# Patient Record
Sex: FEMALE | Race: BLACK OR AFRICAN AMERICAN | NOT HISPANIC OR LATINO | Employment: FULL TIME | URBAN - METROPOLITAN AREA
[De-identification: names, ages, dates, MRNs, and addresses within clinical notes are randomized per-mention and may not be internally consistent; named-entity substitution may affect disease eponyms.]

---

## 2019-09-15 ENCOUNTER — OFFICE VISIT (OUTPATIENT)
Dept: URGENT CARE | Facility: CLINIC | Age: 59
End: 2019-09-15
Payer: COMMERCIAL

## 2019-09-15 VITALS
HEART RATE: 100 BPM | WEIGHT: 140 LBS | SYSTOLIC BLOOD PRESSURE: 156 MMHG | DIASTOLIC BLOOD PRESSURE: 87 MMHG | TEMPERATURE: 102 F | HEIGHT: 69 IN | OXYGEN SATURATION: 98 % | BODY MASS INDEX: 20.73 KG/M2

## 2019-09-15 DIAGNOSIS — N30.01 ACUTE CYSTITIS WITH HEMATURIA: ICD-10-CM

## 2019-09-15 DIAGNOSIS — R50.9 FEVER, UNSPECIFIED FEVER CAUSE: Primary | ICD-10-CM

## 2019-09-15 LAB
BILIRUB UR QL STRIP: NEGATIVE
CTP QC/QA: YES
FLUAV AG NPH QL: NEGATIVE
FLUBV AG NPH QL: NEGATIVE
GLUCOSE UR QL STRIP: NEGATIVE
KETONES UR QL STRIP: NEGATIVE
LEUKOCYTE ESTERASE UR QL STRIP: POSITIVE
PH, POC UA: 5 (ref 5–8)
POC BLOOD, URINE: POSITIVE
POC NITRATES, URINE: POSITIVE
PROT UR QL STRIP: POSITIVE
SP GR UR STRIP: 1.02 (ref 1–1.03)
UROBILINOGEN UR STRIP-ACNC: POSITIVE (ref 0.1–1.1)

## 2019-09-15 PROCEDURE — 87804 POCT INFLUENZA A/B: ICD-10-PCS | Mod: QW,S$GLB,, | Performed by: PHYSICIAN ASSISTANT

## 2019-09-15 PROCEDURE — 3008F BODY MASS INDEX DOCD: CPT | Mod: CPTII,S$GLB,, | Performed by: PHYSICIAN ASSISTANT

## 2019-09-15 PROCEDURE — 3008F PR BODY MASS INDEX (BMI) DOCUMENTED: ICD-10-PCS | Mod: CPTII,S$GLB,, | Performed by: PHYSICIAN ASSISTANT

## 2019-09-15 PROCEDURE — 99204 PR OFFICE/OUTPT VISIT, NEW, LEVL IV, 45-59 MIN: ICD-10-PCS | Mod: S$GLB,,, | Performed by: PHYSICIAN ASSISTANT

## 2019-09-15 PROCEDURE — 87804 INFLUENZA ASSAY W/OPTIC: CPT | Mod: QW,S$GLB,, | Performed by: PHYSICIAN ASSISTANT

## 2019-09-15 PROCEDURE — 81003 URINALYSIS AUTO W/O SCOPE: CPT | Mod: QW,S$GLB,, | Performed by: PHYSICIAN ASSISTANT

## 2019-09-15 PROCEDURE — 99204 OFFICE O/P NEW MOD 45 MIN: CPT | Mod: S$GLB,,, | Performed by: PHYSICIAN ASSISTANT

## 2019-09-15 PROCEDURE — 81003 POCT URINALYSIS, DIPSTICK, AUTOMATED, W/O SCOPE: ICD-10-PCS | Mod: QW,S$GLB,, | Performed by: PHYSICIAN ASSISTANT

## 2019-09-15 RX ORDER — CIPROFLOXACIN 500 MG/1
500 TABLET ORAL 2 TIMES DAILY
Qty: 14 TABLET | Refills: 0 | Status: SHIPPED | OUTPATIENT
Start: 2019-09-15 | End: 2019-09-22

## 2019-09-15 RX ORDER — ATENOLOL 50 MG/1
25 TABLET ORAL DAILY
COMMUNITY

## 2019-09-15 NOTE — LETTER
September 15, 2019      [facility]  [place]  [address]  [place] [address]  Phone: [phone]  Fax: [phone]       Patient: Endy Gillis   YOB: 1960  Date of Visit: 09/15/2019    To Whom It May Concern:    Meghana Gillis  was at [facility] on 09/15/2019. She may return to work/school on 09/18/2019 with no restrictions. If you have any questions or concerns, or if I can be of further assistance, please do not hesitate to contact me.    Sincerely,      Christina Watts PA-C

## 2019-09-15 NOTE — PATIENT INSTRUCTIONS
"*Urinary Tract Infections*  1) Avoid tub baths.  2) Always urinate after intercourse(Teens/Adults).  3) Avoid "Fizzy" drinks/soda drinks.  4) Always wipe from front to back.  5) Wear only cotton underwear.  6) Drink a lot of fluids (at least 8-10 glasses of water) for 5 to 7 days to help flush your kidneys. You can also drink 1 shot-sized glass of cranberry juice 3X daily over the next several days to help cleanse your bladder, but studies show that cranberry juice does not cure or prevent a UTI.   7) Take all medications as directed. Make sure to complete all antibiotics as prescribed.    8) For patients above 6 months of age who are not allergic to and are not on anticoagulants, you can alternate Tylenol and Motrin every 4-6 hours for fever above 100.4F and/or pain.  For patients less than 6 months of age, allergic to or intolerant to NSAIDS, have gastritis, gastric ulcers, or history of GI bleeds, are pregnant, or are on anticoagulant therapy, you can take Tylenol every 4 hours as needed for fever above 100.4F and/or pain.   9) You should schedule a follow-up appointment with your Primary Care Provider/Pediatrician for recheck in 2-3 days or as directed at this visit.   10) If your condition fails to improve in a timely manner, you should receive another evaluation by your Primary Care Provider/Pediatrician to discuss your concerns or return to urgent care for a recheck.  If your condition worsens at any time, you should report immediately to your nearest Emergency Department for further evaluation. **You must understand that you have received Urgent Care treatment only and that you may be released before all of your medical problems are known or treated. You, the patient, are responsible to arrange for follow-up care as instructed.           Bladder Infection, Female (Adult)    Urine is normally doesn't have any bacteria in it. But bacteria can get into the urinary tract from the skin around the rectum. Or they " "can travel in the blood from elsewhere in the body. Once they are in your urinary tract, they can cause infection in the urethra (urethritis), the bladder (cystitis), or the kidneys (pyelonephritis).  The most common place for an infection is in the bladder. This is called a bladder infection. This is one of the most common infections in women. Most bladder infections are easily treated. They are not serious unless the infection spreads to the kidney.  The phrases "bladder infection," "UTI," and "cystitis" are often used to describe the same thing. But they are not always the same. Cystitis is an inflammation of the bladder. The most common cause of cystitis is an infection.  Symptoms  The infection causes inflammation in the urethra and bladder. This causes many of the symptoms. The most common symptoms of a bladder infection are:  · Pain or burning when urinating  · Having to urinate more often than usual  · Urgent need to urinate  · Only a small amount of urine comes out  · Blood in urine  · Abdominal discomfort. This is usually in the lower abdomen above the pubic bone.  · Cloudy urine  · Strong- or bad-smelling urine  · Unable to urinate (urinary retention)  · Unable to hold urine in (urinary incontinence)  · Fever  · Loss of appetite  · Confusion (in older adults)  Causes  Bladder infections are not contagious. You can't get one from someone else, from a toilet seat, or from sharing a bath.  The most common cause of bladder infections is bacteria from the bowels. The bacteria get onto the skin around the opening of the urethra. From there, they can get into the urine and travel up to the bladder, causing inflammation and infection. This usually happens because of:  · Wiping improperly after urinating. Always wipe from front to back.  · Bowel incontinence  · Pregnancy  · Procedures such as having a catheter inserted  · Older age  · Not emptying your bladder. This can allow bacteria a chance to grow in your " urine.  · Dehydration  · Constipation  · Sex  · Use of a diaphragm for birth control   Treatment  Bladder infections are diagnosed by a urine test. They are treated with antibiotics and usually clear up quickly without complications. Treatment helps prevent a more serious kidney infection.  Medicines  Medicines can help in the treatment of a bladder infection:  · Take antibiotics until they are used up, even if you feel better. It is important to finish them to make sure the infection has cleared.  · You can use acetaminophen or ibuprofen for pain, fever, or discomfort, unless another medicine was prescribed. If you have chronic liver or kidney disease, talk with your healthcare provider before using these medicines. Also talk with your provider if you've ever had a stomach ulcer or gastrointestinal bleeding, or are taking blood-thinner medicines.  · If you are given phenazopydridine to reduce burning with urination, it will cause your urine to become a bright orange color. This can stain clothing.  Care and prevention  These self-care steps can help prevent future infections:  · Drink plenty of fluids to prevent dehydration and flush out your bladder. Do this unless you must restrict fluids for other health reasons, or your doctor told you not to.  · Proper cleaning after going to the bathroom is important. Wipe from front to back after using the toilet to prevent the spread of bacteria.  · Urinate more often. Don't try to hold urine in for a long time.  · Wear loose-fitting clothes and cotton underwear. Avoid tight-fitting pants.  · Improve your diet and prevent constipation. Eat more fresh fruit and vegetables, and fiber, and less junk and fatty foods.  · Avoid sex until your symptoms are gone.  · Avoid caffeine, alcohol, and spicy foods. These can irritate your bladder.  · Urinate right after intercourse to flush out your bladder.  · If you use birth control pills and have frequent bladder infections, discuss it  with your doctor.  Follow-up care  Call your healthcare provider if all symptoms are not gone after 3 days of treatment. This is especially important if you have repeat infections.  If a culture was done, you will be told if your treatment needs to be changed. If directed, you can call to find out the results.  If X-rays were done, you will be told if the results will affect your treatment.  Call 911  Call 911 if any of the following occur:  · Trouble breathing  · Hard to wake up or confusion  · Fainting or loss of consciousness  · Rapid heart rate  When to seek medical advice  Call your healthcare provider right away if any of these occur:  · Fever of 100.4ºF (38.0ºC) or higher, or as directed by your healthcare provider  · Symptoms are not better by the third day of treatment  · Back or belly (abdominal) pain that gets worse  · Repeated vomiting, or unable to keep medicine down  · Weakness or dizziness  · Vaginal discharge  · Pain, redness, or swelling in the outer vaginal area (labia)  Date Last Reviewed: 10/1/2016  © 9348-8036 [employer]. [address] [place] [address], [place] [address]. All rights reserved. This information is not intended as a substitute for professional medical care. Always follow your healthcare professional's instructions.

## 2019-09-15 NOTE — PROGRESS NOTES
"Subjective:       Patient ID: Endy Gillis is a 59 y.o. female.    Vitals:  height is 5' 9" (1.753 m) and weight is 63.5 kg (140 lb). Her tympanic temperature is 101.8 °F (38.8 °C) (abnormal). Her blood pressure is 156/87 (abnormal) and her pulse is 100. Her oxygen saturation is 98%.     Chief Complaint: Nausea    59-year-old female presents to clinic today with complaints of feeling bad for the past week.  Patient states that she has been running a fever of around 101, having night sweats, fatigue, and decreased appetite.  She does report intermittent nausea.  Patient states that she has also had an increase in urinary frequency and urgency outside of her norm.  Other than that, patient states that she has had no real symptoms.  She states that she is just feeling terrible.  Patient denies any other complaints at this time.    Fever    This is a new problem. The current episode started in the past 7 days. The problem occurs constantly. The problem has been unchanged. The maximum temperature noted was 101 to 101.9 F. The temperature was taken using an axillary reading. Associated symptoms include nausea. Pertinent negatives include no abdominal pain, chest pain, congestion, coughing, diarrhea, ear pain, headaches, muscle aches, rash, sleepiness, sore throat, urinary pain, vomiting or wheezing. She has tried acetaminophen and NSAIDs for the symptoms. The treatment provided mild relief.   Risk factors: no contaminated food, no contaminated water, no hx of cancer, no immunosuppression, no occupational exposure, no recent sickness, no recent travel and no sick contacts        Constitution: Positive for appetite change (decreased), chills, fatigue and fever.   HENT: Negative for ear pain, congestion and sore throat.    Neck: Negative for painful lymph nodes.   Cardiovascular: Negative for chest pain and leg swelling.   Eyes: Negative for double vision and blurred vision.   Respiratory: Negative for cough, shortness of " breath and wheezing.    Gastrointestinal: Positive for nausea. Negative for abdominal pain, vomiting, diarrhea, dark colored stools, rectal bleeding and bowel incontinence.   Genitourinary: Positive for frequency and urgency. Negative for dysuria, urine decreased, flank pain, bladder incontinence, bed wetting, hematuria, history of kidney stones and vaginal discharge.   Musculoskeletal: Negative for muscle cramps.   Skin: Negative for color change, pale, rash and bruising.   Allergic/Immunologic: Negative for seasonal allergies.   Neurological: Negative for dizziness, light-headedness, passing out and headaches.   Hematologic/Lymphatic: Negative for swollen lymph nodes.   Psychiatric/Behavioral: Negative for nervous/anxious, sleep disturbance and depression. The patient is not nervous/anxious.        Objective:      Physical Exam   Constitutional: She is oriented to person, place, and time. She appears well-developed and well-nourished. She is cooperative.  Non-toxic appearance. She does not appear ill. No distress.   HENT:   Head: Normocephalic and atraumatic.   Right Ear: Hearing, tympanic membrane, external ear and ear canal normal.   Left Ear: Hearing, tympanic membrane, external ear and ear canal normal.   Nose: Nose normal. No mucosal edema, rhinorrhea or nasal deformity. No epistaxis. Right sinus exhibits no maxillary sinus tenderness and no frontal sinus tenderness. Left sinus exhibits no maxillary sinus tenderness and no frontal sinus tenderness.   Mouth/Throat: Uvula is midline, oropharynx is clear and moist and mucous membranes are normal. No trismus in the jaw. Normal dentition. No uvula swelling. No posterior oropharyngeal erythema.   Eyes: Conjunctivae and lids are normal. Right eye exhibits no discharge. Left eye exhibits no discharge. No scleral icterus.   Sclera clear bilat   Neck: Trachea normal, normal range of motion, full passive range of motion without pain and phonation normal. Neck supple.    Cardiovascular: Normal rate, regular rhythm, normal heart sounds, intact distal pulses and normal pulses.   Pulmonary/Chest: Effort normal and breath sounds normal. No respiratory distress.   Abdominal: Soft. Normal appearance and bowel sounds are normal. She exhibits no distension, no pulsatile midline mass and no mass. There is no hepatosplenomegaly. There is no tenderness. There is no rigidity, no rebound, no guarding, no CVA tenderness, no tenderness at McBurney's point and negative Osman's sign.   Musculoskeletal: Normal range of motion. She exhibits no edema or deformity.   Neurological: She is alert and oriented to person, place, and time. She exhibits normal muscle tone. Coordination normal.   Skin: Skin is warm, dry and intact. She is not diaphoretic. No pallor.   Psychiatric: She has a normal mood and affect. Her speech is normal and behavior is normal. Judgment and thought content normal. Cognition and memory are normal.   Nursing note and vitals reviewed.      Results for orders placed or performed in visit on 09/15/19   POCT Influenza A/B   Result Value Ref Range    Rapid Influenza A Ag Negative Negative    Rapid Influenza B Ag Negative Negative     Acceptable Yes    POCT Urinalysis, Dipstick, Automated, W/O Scope   Result Value Ref Range    POC Blood, Urine Positive (A) Negative    POC Bilirubin, Urine Negative Negative    POC Urobilinogen, Urine positive 0.1 - 1.1    POC Ketones, Urine Negative Negative    POC Protein, Urine Positive (A) Negative    POC Nitrates, Urine Positive (A) Negative    POC Glucose, Urine Negative Negative    pH, UA 5.0 5 - 8    POC Specific Gravity, Urine 1.020 1.003 - 1.029    POC Leukocytes, Urine Positive (A) Negative       Assessment:       1. Fever, unspecified fever cause    2. Acute cystitis with hematuria        Plan:         Fever, unspecified fever cause  -     POCT Influenza A/B  -     POCT Urinalysis, Dipstick, Automated, W/O Scope    Acute  "cystitis with hematuria  -     Culture, Urine  -     ciprofloxacin HCl (CIPRO) 500 MG tablet; Take 1 tablet (500 mg total) by mouth 2 (two) times daily. for 7 days  Dispense: 14 tablet; Refill: 0      Patient Instructions   *Urinary Tract Infections*  1) Avoid tub baths.  2) Always urinate after intercourse(Teens/Adults).  3) Avoid "Fizzy" drinks/soda drinks.  4) Always wipe from front to back.  5) Wear only cotton underwear.  6) Drink a lot of fluids (at least 8-10 glasses of water) for 5 to 7 days to help flush your kidneys. You can also drink 1 shot-sized glass of cranberry juice 3X daily over the next several days to help cleanse your bladder, but studies show that cranberry juice does not cure or prevent a UTI.   7) Take all medications as directed. Make sure to complete all antibiotics as prescribed.    8) For patients above 6 months of age who are not allergic to and are not on anticoagulants, you can alternate Tylenol and Motrin every 4-6 hours for fever above 100.4F and/or pain.  For patients less than 6 months of age, allergic to or intolerant to NSAIDS, have gastritis, gastric ulcers, or history of GI bleeds, are pregnant, or are on anticoagulant therapy, you can take Tylenol every 4 hours as needed for fever above 100.4F and/or pain.   9) You should schedule a follow-up appointment with your Primary Care Provider/Pediatrician for recheck in 2-3 days or as directed at this visit.   10) If your condition fails to improve in a timely manner, you should receive another evaluation by your Primary Care Provider/Pediatrician to discuss your concerns or return to urgent care for a recheck.  If your condition worsens at any time, you should report immediately to your nearest Emergency Department for further evaluation. **You must understand that you have received Urgent Care treatment only and that you may be released before all of your medical problems are known or treated. You, the patient, are responsible to " "arrange for follow-up care as instructed.           Bladder Infection, Female (Adult)    Urine is normally doesn't have any bacteria in it. But bacteria can get into the urinary tract from the skin around the rectum. Or they can travel in the blood from elsewhere in the body. Once they are in your urinary tract, they can cause infection in the urethra (urethritis), the bladder (cystitis), or the kidneys (pyelonephritis).  The most common place for an infection is in the bladder. This is called a bladder infection. This is one of the most common infections in women. Most bladder infections are easily treated. They are not serious unless the infection spreads to the kidney.  The phrases "bladder infection," "UTI," and "cystitis" are often used to describe the same thing. But they are not always the same. Cystitis is an inflammation of the bladder. The most common cause of cystitis is an infection.  Symptoms  The infection causes inflammation in the urethra and bladder. This causes many of the symptoms. The most common symptoms of a bladder infection are:  · Pain or burning when urinating  · Having to urinate more often than usual  · Urgent need to urinate  · Only a small amount of urine comes out  · Blood in urine  · Abdominal discomfort. This is usually in the lower abdomen above the pubic bone.  · Cloudy urine  · Strong- or bad-smelling urine  · Unable to urinate (urinary retention)  · Unable to hold urine in (urinary incontinence)  · Fever  · Loss of appetite  · Confusion (in older adults)  Causes  Bladder infections are not contagious. You can't get one from someone else, from a toilet seat, or from sharing a bath.  The most common cause of bladder infections is bacteria from the bowels. The bacteria get onto the skin around the opening of the urethra. From there, they can get into the urine and travel up to the bladder, causing inflammation and infection. This usually happens because of:  · Wiping improperly " after urinating. Always wipe from front to back.  · Bowel incontinence  · Pregnancy  · Procedures such as having a catheter inserted  · Older age  · Not emptying your bladder. This can allow bacteria a chance to grow in your urine.  · Dehydration  · Constipation  · Sex  · Use of a diaphragm for birth control   Treatment  Bladder infections are diagnosed by a urine test. They are treated with antibiotics and usually clear up quickly without complications. Treatment helps prevent a more serious kidney infection.  Medicines  Medicines can help in the treatment of a bladder infection:  · Take antibiotics until they are used up, even if you feel better. It is important to finish them to make sure the infection has cleared.  · You can use acetaminophen or ibuprofen for pain, fever, or discomfort, unless another medicine was prescribed. If you have chronic liver or kidney disease, talk with your healthcare provider before using these medicines. Also talk with your provider if you've ever had a stomach ulcer or gastrointestinal bleeding, or are taking blood-thinner medicines.  · If you are given phenazopydridine to reduce burning with urination, it will cause your urine to become a bright orange color. This can stain clothing.  Care and prevention  These self-care steps can help prevent future infections:  · Drink plenty of fluids to prevent dehydration and flush out your bladder. Do this unless you must restrict fluids for other health reasons, or your doctor told you not to.  · Proper cleaning after going to the bathroom is important. Wipe from front to back after using the toilet to prevent the spread of bacteria.  · Urinate more often. Don't try to hold urine in for a long time.  · Wear loose-fitting clothes and cotton underwear. Avoid tight-fitting pants.  · Improve your diet and prevent constipation. Eat more fresh fruit and vegetables, and fiber, and less junk and fatty foods.  · Avoid sex until your symptoms are  gone.  · Avoid caffeine, alcohol, and spicy foods. These can irritate your bladder.  · Urinate right after intercourse to flush out your bladder.  · If you use birth control pills and have frequent bladder infections, discuss it with your doctor.  Follow-up care  Call your healthcare provider if all symptoms are not gone after 3 days of treatment. This is especially important if you have repeat infections.  If a culture was done, you will be told if your treatment needs to be changed. If directed, you can call to find out the results.  If X-rays were done, you will be told if the results will affect your treatment.  Call 911  Call 911 if any of the following occur:  · Trouble breathing  · Hard to wake up or confusion  · Fainting or loss of consciousness  · Rapid heart rate  When to seek medical advice  Call your healthcare provider right away if any of these occur:  · Fever of 100.4ºF (38.0ºC) or higher, or as directed by your healthcare provider  · Symptoms are not better by the third day of treatment  · Back or belly (abdominal) pain that gets worse  · Repeated vomiting, or unable to keep medicine down  · Weakness or dizziness  · Vaginal discharge  · Pain, redness, or swelling in the outer vaginal area (labia)  Date Last Reviewed: 10/1/2016  © 2283-2566 [employer] [address] [place] [address], [place] [address]. All rights reserved. This information is not intended as a substitute for professional medical care. Always follow your healthcare professional's instructions.

## 2019-09-19 ENCOUNTER — TELEPHONE (OUTPATIENT)
Dept: URGENT CARE | Facility: CLINIC | Age: 59
End: 2019-09-19

## 2019-09-20 LAB
BACTERIA UR CULT: ABNORMAL
BACTERIA UR CULT: ABNORMAL
OTHER ANTIBIOTIC SUSC ISLT: ABNORMAL

## 2019-09-22 ENCOUNTER — TELEPHONE (OUTPATIENT)
Dept: URGENT CARE | Facility: CLINIC | Age: 59
End: 2019-09-22

## 2019-09-22 NOTE — TELEPHONE ENCOUNTER
Left messages at home and mobile number asking pt to return call. See urine culture results from 9/15/19 visit.

## 2019-09-23 ENCOUNTER — TELEPHONE (OUTPATIENT)
Dept: URGENT CARE | Facility: CLINIC | Age: 59
End: 2019-09-23

## 2019-09-24 ENCOUNTER — TELEPHONE (OUTPATIENT)
Dept: URGENT CARE | Facility: CLINIC | Age: 59
End: 2019-09-24

## 2019-09-24 RX ORDER — SULFAMETHOXAZOLE AND TRIMETHOPRIM 800; 160 MG/1; MG/1
1 TABLET ORAL 2 TIMES DAILY
Qty: 20 TABLET | Refills: 0 | Status: SHIPPED | OUTPATIENT
Start: 2019-09-24 | End: 2019-10-04

## 2019-09-24 NOTE — TELEPHONE ENCOUNTER
Called to discuss positive urine culture results with patient.  Bacteria is resistant to Cipro which is what patient was prescribed at her last visit.  Sent in Bactrim DS twice daily for the next 10 days to [facility] in [place].  Left urgent message for patient to return my call.

## 2019-09-24 NOTE — TELEPHONE ENCOUNTER
Called emergency contact, reports he talked to her this am and she seemed to be doing fine. Advised for him to ask her to give us a call back.

## 2019-09-28 ENCOUNTER — TELEPHONE (OUTPATIENT)
Dept: URGENT CARE | Facility: CLINIC | Age: 59
End: 2019-09-28

## 2019-09-28 NOTE — TELEPHONE ENCOUNTER
Attempted to call patient on house and mobile numbers listed in chart.  There was no answer at either number.  Left message for patient to return my call.

## 2019-11-04 ENCOUNTER — OFFICE VISIT (OUTPATIENT)
Dept: WOUND CARE | Facility: HOSPITAL | Age: 59
End: 2019-11-04
Attending: SURGERY
Payer: COMMERCIAL

## 2019-11-04 VITALS — HEART RATE: 77 BPM | DIASTOLIC BLOOD PRESSURE: 92 MMHG | SYSTOLIC BLOOD PRESSURE: 158 MMHG

## 2019-11-04 DIAGNOSIS — M86.171 OTHER ACUTE OSTEOMYELITIS OF RIGHT FOOT: Primary | ICD-10-CM

## 2019-11-04 DIAGNOSIS — L97.512 NEUROPATHIC ULCER OF FOOT WITH FAT LAYER EXPOSED, RIGHT: ICD-10-CM

## 2019-11-04 DIAGNOSIS — S34.109A: ICD-10-CM

## 2019-11-04 PROBLEM — M86.9 OSTEOMYELITIS: Status: ACTIVE | Noted: 2019-11-04

## 2019-11-04 PROCEDURE — 99499 NO LOS: ICD-10-PCS | Mod: ,,, | Performed by: SURGERY

## 2019-11-04 PROCEDURE — 27201912 HC WOUND CARE DEBRIDEMENT SUPPLIES

## 2019-11-04 PROCEDURE — 99203 OFFICE O/P NEW LOW 30 MIN: CPT

## 2019-11-04 PROCEDURE — 11042 DBRDMT SUBQ TIS 1ST 20SQCM/<: CPT

## 2019-11-04 PROCEDURE — 99499 UNLISTED E&M SERVICE: CPT | Mod: ,,, | Performed by: SURGERY

## 2019-11-04 RX ORDER — DULOXETIN HYDROCHLORIDE 60 MG/1
60 CAPSULE, DELAYED RELEASE ORAL DAILY
COMMUNITY

## 2019-11-04 RX ORDER — PANTOPRAZOLE SODIUM 40 MG/1
40 TABLET, DELAYED RELEASE ORAL DAILY
COMMUNITY

## 2019-11-04 RX ORDER — HYDROCODONE BITARTRATE AND ACETAMINOPHEN 5; 325 MG/1; MG/1
1 TABLET ORAL EVERY 6 HOURS PRN
COMMUNITY

## 2019-11-04 RX ORDER — CEFADROXIL 500 MG/1
1 CAPSULE ORAL EVERY 12 HOURS
COMMUNITY
Start: 2019-10-06 | End: 2019-11-11

## 2019-11-04 RX ORDER — SUCRALFATE 1 G/1
1 TABLET ORAL 4 TIMES DAILY
COMMUNITY

## 2019-11-04 RX ORDER — TEMAZEPAM 7.5 MG/1
30 CAPSULE ORAL NIGHTLY PRN
COMMUNITY

## 2019-11-04 RX ORDER — MEGESTROL ACETATE 125 MG/ML
625 SUSPENSION ORAL DAILY
COMMUNITY

## 2019-11-04 NOTE — ASSESSMENT & PLAN NOTE
Patient will need better offloading as she is noncompliant with crutches due to difficulty.  Will attempt order the patient a knee scooter.  Patient will keep follow-up appointment with Dr. Jack to see if further antibiotic treatment is indicated.  Will change to silver rope dressing change.  Sharp debridement as needed.

## 2019-11-04 NOTE — PROGRESS NOTES
[facility]  Wound Care  History and Physical    Problem List Items Addressed This Visit     Osteomyelitis - Primary    Overview     Patient sustained trauma to her right heel and developed an open wound several months ago.  Patient developed worsening wound infection and was admitted to the hospital and started on IV antibiotics.  She underwent debridement and states she was treated for osteomyelitis.  She was discharged to LTAC facility where she received 2 weeks of IV antibiotics.  Patient remains on oral antibiotics and has follow-up scheduled Infectious Disease tomorrow.  Patient has been receiving Dakin's dressing changes.  Patient has underlying neuropathy from previous spinal cord injury. Patient also has a foot deformity.  Patient's has been attempting offloading with crutches but has difficulty ambulating with crutches so she has been noncompliant.  Patient denies any fever chills or significant drainage.         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  Patient developed worsening wound infection and was admitted to the hospital and started on IV antibiotics.  She underwent debridement and states she was treated for osteomyelitis.  She was discharged to LTAC facility where she received 2 weeks of IV antibiotics.  Patient remains on oral antibiotics and has follow-up scheduled Infectious Disease tomorrow.  Patient has been receiving Dakin's dressing changes.  Patient has underlying neuropathy from previous spinal cord injury. Patient also has a foot deformity.  Patient's has been attempting offloading with crutches but has difficulty ambulating with crutches so she has been noncompliant.  Patient denies any fever chills or significant drainage.           Current Assessment & Plan     Patient will need better offloading as she is noncompliant with crutches due to  difficulty.  Will attempt order the patient a knee scooter.  Patient will keep follow-up appointment with Dr. Jack to see if further antibiotic treatment is indicated.  Will change to silver rope dressing change.  Sharp debridement as needed.                 History:    Past Medical History:   Diagnosis Date    Hypertension        No past surgical history on file.    Family History   Problem Relation Age of Onset    No Known Problems Mother     No Known Problems Father         reports that she has never smoked. She has never used smokeless tobacco. She reports that she does not drink alcohol or use drugs.    has a current medication list which includes the following prescription(s): atenolol.    Allergies:  Patient has no known allergies.    Review of Systems:  ROS      There were no vitals filed for this visit.      BMI:  There is no height or weight on file to calculate BMI.    Physical Exam:  Physical Exam  Awake alert no acute distress.  Chest is clear with equal breath sounds bilaterally.  Heart is regular.  Abdomen is soft with no masses or tenderness.  Patient has hammertoe deformity of the right ft digits. There is a created during type ulcer of the right heel.  There is significant surrounding callus and maceration.  There is serous type drainage but no sign of infection.  There is no palpable bone on examination of the wound.  There is granulation tissue present.  Patient's foot is warm with a palpable pedal pulse.  A1C:  No results for input(s): HGBA1C in the last 2160 hours.  BMP:  No results for input(s): GLU, NA, K, CL, CO2, BUN, CREATININE, CALCIUM, MG in the last 2160 hours.   CBC:  No results for input(s): WBC, RBC, HGB, HCT, PLT, MCV, MCH, MCHC in the last 2160 hours.  CMP:  No results for input(s): GLU, CALCIUM, ALBUMIN, PROT, NA, K, CO2, CL, BUN, ALKPHOS, ALT, AST, BILITOT in the last 2160 hours.    Invalid input(s): CREATININ  PREALBUMIN:  No results for input(s): PREALBUMIN in the last  2160 hours.  WOUND CULTURES:  No results for input(s): LABAERO in the last 2160 hours.        Plan:  See Wound Docs note for plan and follow up.        Delfin MONTALVO Marino [facility]

## 2019-11-11 ENCOUNTER — OFFICE VISIT (OUTPATIENT)
Dept: WOUND CARE | Facility: HOSPITAL | Age: 59
End: 2019-11-11
Attending: SURGERY
Payer: COMMERCIAL

## 2019-11-11 VITALS
TEMPERATURE: 98 F | HEART RATE: 69 BPM | SYSTOLIC BLOOD PRESSURE: 168 MMHG | DIASTOLIC BLOOD PRESSURE: 105 MMHG | RESPIRATION RATE: 18 BRPM

## 2019-11-11 DIAGNOSIS — M86.171 ACUTE OSTEOMYELITIS OF RIGHT FOOT: ICD-10-CM

## 2019-11-11 DIAGNOSIS — M21.371 FOOT DROP, RIGHT: ICD-10-CM

## 2019-11-11 DIAGNOSIS — L97.512 NEUROPATHIC ULCER OF FOOT WITH FAT LAYER EXPOSED, RIGHT: ICD-10-CM

## 2019-11-11 PROBLEM — M86.271 SUBACUTE OSTEOMYELITIS OF RIGHT FOOT: Status: ACTIVE | Noted: 2019-11-04

## 2019-11-11 PROCEDURE — 27201912 HC WOUND CARE DEBRIDEMENT SUPPLIES

## 2019-11-11 PROCEDURE — 99499 UNLISTED E&M SERVICE: CPT | Mod: ,,, | Performed by: SURGERY

## 2019-11-11 PROCEDURE — 11042 DBRDMT SUBQ TIS 1ST 20SQCM/<: CPT

## 2019-11-11 PROCEDURE — 99499 NO LOS: ICD-10-PCS | Mod: ,,, | Performed by: SURGERY

## 2019-11-11 NOTE — ASSESSMENT & PLAN NOTE
See Dr. Naresh Jack as planned to determine need for further antibiotic treatment.  Obtain records from hospitalization.

## 2019-11-11 NOTE — PROGRESS NOTES
[facility]  Wound Care  Progress Note    Problem List Items Addressed This Visit        Neuro    Foot drop, right    Overview     Patient has chronic right footdrop and has been use in an AFO a prosthetic for a long time.            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  Patient developed worsening wound infection and was admitted to the hospital and started on IV antibiotics.  She underwent debridement and states she was treated for osteomyelitis.  She was discharged to LT facility where she received 2 weeks of IV antibiotics.  Patient remains on oral antibiotics and has follow-up scheduled with Infectious Disease.  Patient had been receiving Dakin's dressing changes.  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 Patient also has a foot deformity.  Patient's has been attempting offloading with crutches but has difficulty ambulating with crutches.  Patient denies any fever chills or significant drainage.           Current Assessment & Plan     Debridement to remove nonviable tissue and maintain active phase wound healing.  Improve offloading with heel wedge offloading shoe.  Patient will attempt to obtain a knee walker.  Continue silver alginate.            ID    Acute osteomyelitis of right foot    Overview     Patient sustained trauma to her right heel and developed an open wound several months ago.  Patient developed worsening wound infection and was admitted to the hospital and started on IV antibiotics.  She underwent debridement and states she was treated for osteomyelitis.  She was discharged to LTAC facility where she received 2 weeks of IV antibiotics.  Patient remains on oral antibiotics and has follow-up scheduled with Infectious Disease.  Patient had been receiving Dakin's dressing changes.  Patient has  underlying neuropathy from previous spinal cord injury with right foot drop. Patient also has a foot deformity.  Patient's has been attempting offloading with crutches but has difficulty ambulating with crutches.  Patient denies any fever chills or significant drainage.         Current Assessment & Plan     See Dr. Naresh Jack as planned to determine need for further antibiotic treatment.  Obtain records from hospitalization.               See wound doc progress notes. Documents will be scanned.        Jl ANDERSON Hebert [facility]

## 2019-11-11 NOTE — ASSESSMENT & PLAN NOTE
Debridement to remove nonviable tissue and maintain active phase wound healing.  Improve offloading with heel wedge offloading shoe.  Patient will attempt to obtain a knee walker.  Continue silver alginate.

## 2019-11-18 ENCOUNTER — OFFICE VISIT (OUTPATIENT)
Dept: WOUND CARE | Facility: HOSPITAL | Age: 59
End: 2019-11-18
Attending: SURGERY
Payer: COMMERCIAL

## 2019-11-18 VITALS — RESPIRATION RATE: 16 BRPM | TEMPERATURE: 98 F

## 2019-11-18 DIAGNOSIS — M86.171 ACUTE OSTEOMYELITIS OF RIGHT FOOT: ICD-10-CM

## 2019-11-18 PROCEDURE — 99499 NO LOS: ICD-10-PCS | Mod: ,,, | Performed by: SURGERY

## 2019-11-18 PROCEDURE — 11042 DBRDMT SUBQ TIS 1ST 20SQCM/<: CPT

## 2019-11-18 PROCEDURE — 99499 UNLISTED E&M SERVICE: CPT | Mod: ,,, | Performed by: SURGERY

## 2019-11-18 PROCEDURE — 27201912 HC WOUND CARE DEBRIDEMENT SUPPLIES

## 2019-11-18 RX ORDER — AMOXICILLIN AND CLAVULANATE POTASSIUM 875; 125 MG/1; MG/1
1 TABLET, FILM COATED ORAL 2 TIMES DAILY
Refills: 0 | COMMUNITY
Start: 2019-11-12

## 2019-11-18 RX ORDER — SULFAMETHOXAZOLE AND TRIMETHOPRIM 800; 160 MG/1; MG/1
1 TABLET ORAL 2 TIMES DAILY
Refills: 0 | COMMUNITY
Start: 2019-11-12

## 2019-11-18 NOTE — ASSESSMENT & PLAN NOTE
Wound decreased in size.  There is no drainage or signs of infection.  Wound is fairly clean with granulation tissue.  There is no exposed bone.  Will continue silver rope.

## 2019-11-18 NOTE — PROGRESS NOTES
[facility]  Wound Care  Progress Note    Problem List Items Addressed This Visit     Acute osteomyelitis of right foot    Overview     Patient sustained trauma to her right heel and developed an open wound several months ago.  Patient developed worsening wound infection and was admitted to the hospital and started on IV antibiotics.  She underwent debridement and states she was treated for osteomyelitis.  She was discharged to LTAC facility where she received 2 weeks of IV antibiotics.  Patient remains on oral antibiotics and has follow-up scheduled with Infectious Disease.  Patient had been receiving Dakin's dressing changes.  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 Patient also has a foot deformity.  Patient's has been attempting offloading with crutches but has difficulty ambulating with crutches.  Patient denies any fever chills or significant drainage.         Current Assessment & Plan     Wound decreased in size.  There is no drainage or signs of infection.  Wound is fairly clean with granulation tissue.  There is no exposed bone.  Will continue silver rope.               See wound doc progress notes. Documents will be scanned.        Delfin Silva  [facility]

## 2019-11-25 ENCOUNTER — TELEPHONE (OUTPATIENT)
Dept: URGENT CARE | Facility: CLINIC | Age: 59
End: 2019-11-25

## 2019-11-25 ENCOUNTER — OFFICE VISIT (OUTPATIENT)
Dept: WOUND CARE | Facility: HOSPITAL | Age: 59
End: 2019-11-25
Attending: SURGERY
Payer: COMMERCIAL

## 2019-11-25 VITALS
TEMPERATURE: 98 F | DIASTOLIC BLOOD PRESSURE: 98 MMHG | SYSTOLIC BLOOD PRESSURE: 130 MMHG | RESPIRATION RATE: 20 BRPM | HEART RATE: 78 BPM

## 2019-11-25 DIAGNOSIS — L97.512 NEUROPATHIC ULCER OF FOOT WITH FAT LAYER EXPOSED, RIGHT: ICD-10-CM

## 2019-11-25 DIAGNOSIS — M86.171 ACUTE OSTEOMYELITIS OF RIGHT FOOT: ICD-10-CM

## 2019-11-25 PROCEDURE — 99499 UNLISTED E&M SERVICE: CPT | Mod: ,,, | Performed by: SURGERY

## 2019-11-25 PROCEDURE — 99499 NO LOS: ICD-10-PCS | Mod: ,,, | Performed by: SURGERY

## 2019-11-25 PROCEDURE — 27201912 HC WOUND CARE DEBRIDEMENT SUPPLIES

## 2019-11-25 PROCEDURE — 11042 DBRDMT SUBQ TIS 1ST 20SQCM/<: CPT

## 2019-11-25 NOTE — ASSESSMENT & PLAN NOTE
Wound is much improved.  Continue debridement to maintain active phase wound healing.  Close monitoring for signs of infection.  Continue offloading with a knee walker.  Long-term a new AFO will be created.

## 2019-11-25 NOTE — TELEPHONE ENCOUNTER
Received certified letter back to office. This was specific to resistant UTI in sept. In reviewing chart today, pt has been on numerous a/b for osteomyelitis including IV treatment. No further tx or contact needed.

## 2019-11-25 NOTE — PROGRESS NOTES
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  Patient developed worsening wound infection and was admitted to the hospital and started on IV antibiotics.  She underwent debridement and states she was treated for osteomyelitis.  She was discharged to LTAC facility where she received 2 weeks of IV antibiotics.  Patient remains on oral antibiotics and has follow-up scheduled with Infectious Disease.  Patient had been receiving Dakin's dressing changes.  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 Patient also has a foot deformity.  Patient's had been attempting offloading with crutches but has difficulty ambulating with crutches.  She has been able to obtain a knee walker for use.  Patient denies any fever chills or significant drainage.           Current Assessment & Plan     Wound is much improved.  Continue debridement to maintain active phase wound healing.  Close monitoring for signs of infection.  Continue offloading with a knee walker.  Long-term a new AFO will be created.            ID    Acute osteomyelitis of right foot    Overview     Patient sustained trauma to her right heel and developed an open wound several months ago.  Patient developed worsening wound infection and was admitted to the hospital and started on IV antibiotics.  She underwent debridement and states she was treated for osteomyelitis.  She was discharged to LTAC facility where she received 2 weeks of IV antibiotics.  Patient remains on oral antibiotics and has follow-up scheduled with Infectious Disease.  Patient had been receiving Dakin's dressing changes.  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 Patient also has a  foot deformity.  Patient's has been attempting offloading with crutches but has difficulty ambulating with crutches.  Patient denies any fever chills or significant drainage.  Patient was able to follow up with Dr. Jack and has been restarted on antibiotics, Augmentin and Bactrim.               See wound doc progress notes. Documents will be scanned.        Jl ANDERSON Fishman  [facility]

## 2019-12-02 ENCOUNTER — OFFICE VISIT (OUTPATIENT)
Dept: WOUND CARE | Facility: HOSPITAL | Age: 59
End: 2019-12-02
Attending: SURGERY
Payer: COMMERCIAL

## 2019-12-02 VITALS
HEART RATE: 81 BPM | TEMPERATURE: 98 F | RESPIRATION RATE: 18 BRPM | DIASTOLIC BLOOD PRESSURE: 87 MMHG | SYSTOLIC BLOOD PRESSURE: 121 MMHG

## 2019-12-02 DIAGNOSIS — L97.512 NEUROPATHIC ULCER OF FOOT WITH FAT LAYER EXPOSED, RIGHT: ICD-10-CM

## 2019-12-02 PROCEDURE — 99499 NO LOS: ICD-10-PCS | Mod: ,,, | Performed by: SURGERY

## 2019-12-02 PROCEDURE — 27201912 HC WOUND CARE DEBRIDEMENT SUPPLIES

## 2019-12-02 PROCEDURE — 99499 UNLISTED E&M SERVICE: CPT | Mod: ,,, | Performed by: SURGERY

## 2019-12-02 PROCEDURE — 11042 DBRDMT SUBQ TIS 1ST 20SQCM/<: CPT

## 2019-12-02 NOTE — ASSESSMENT & PLAN NOTE
Wound improved.  No sign of infection.   Continue offloading.  Continue sharp debridement is needed.  Continue turned dressing changes.

## 2019-12-02 NOTE — PROGRESS NOTES
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  Patient developed worsening wound infection and was admitted to the hospital and started on IV antibiotics.  She underwent debridement and states she was treated for osteomyelitis.  She was discharged to LTAC facility where she received 2 weeks of IV antibiotics.  Patient remains on oral antibiotics and has follow-up scheduled with Infectious Disease.  Patient had been receiving Dakin's dressing changes.  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 Patient also has a foot deformity.  Patient's had been attempting offloading with crutches but has difficulty ambulating with crutches.  She has been able to obtain a knee walker for use.  Patient denies any fever chills or significant drainage.           Current Assessment & Plan     Wound improved.  No sign of infection.   Continue offloading.  Continue sharp debridement is needed.  Continue turned dressing changes.               See wound doc progress notes. Documents will be scanned.        Delfin Silva  [facility]

## 2019-12-09 ENCOUNTER — OFFICE VISIT (OUTPATIENT)
Dept: WOUND CARE | Facility: HOSPITAL | Age: 59
End: 2019-12-09
Attending: SURGERY
Payer: COMMERCIAL

## 2019-12-09 VITALS — TEMPERATURE: 98 F | RESPIRATION RATE: 18 BRPM

## 2019-12-09 DIAGNOSIS — M21.371 FOOT DROP, RIGHT: ICD-10-CM

## 2019-12-09 DIAGNOSIS — L97.512 NEUROPATHIC ULCER OF FOOT WITH FAT LAYER EXPOSED, RIGHT: Primary | ICD-10-CM

## 2019-12-09 DIAGNOSIS — M86.171 ACUTE OSTEOMYELITIS OF RIGHT FOOT: ICD-10-CM

## 2019-12-09 PROCEDURE — 99499 NO LOS: ICD-10-PCS | Mod: ,,, | Performed by: SURGERY

## 2019-12-09 PROCEDURE — 99499 UNLISTED E&M SERVICE: CPT | Mod: ,,, | Performed by: SURGERY

## 2019-12-09 PROCEDURE — 27201912 HC WOUND CARE DEBRIDEMENT SUPPLIES

## 2019-12-09 PROCEDURE — 11042 DBRDMT SUBQ TIS 1ST 20SQCM/<: CPT

## 2019-12-09 NOTE — ASSESSMENT & PLAN NOTE
Continue debridement to maintain active phase wound healing.  Continue offloading with a knee walker, crutches and he will offloading shoe.

## 2019-12-09 NOTE — PROGRESS NOTES
[facility]  Wound Care  Progress Note    Problem List Items Addressed This Visit        Neuro    Foot drop, right    Overview     Patient has chronic right footdrop and has been use in an AFO a prosthetic for a long time.            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  Patient developed worsening wound infection and was admitted to the hospital and started on IV antibiotics.  She underwent debridement and states she was treated for osteomyelitis.  She was discharged to LTAC facility where she received 2 weeks of IV antibiotics.  Patient remains on oral antibiotics and has follow-up scheduled with Infectious Disease.  Patient had been receiving Dakin's dressing changes.  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 Patient also has a foot deformity.  Patient's had been attempting offloading with crutches but has difficulty ambulating with crutches.  She has been able to obtain a knee walker for use.  Patient denies any fever chills or significant drainage.           Current Assessment & Plan     Continue debridement to maintain active phase wound healing.  Continue offloading with a knee walker, crutches and he will offloading shoe.            ID    Acute osteomyelitis of right foot    Overview     Patient sustained trauma to her right heel and developed an open wound several months ago.  Patient developed worsening wound infection and was admitted to the hospital and started on IV antibiotics.  She underwent debridement and states she was treated for osteomyelitis.  She was discharged to LTAC facility where she received 2 weeks of IV antibiotics.  Patient remains on oral antibiotics and has follow-up scheduled with Infectious Disease.  Patient had been receiving Dakin's dressing changes.  Patient has underlying  neuropathy from previous spinal cord injury with right foot drop. Patient also has a foot deformity.  Patient's has been attempting offloading with crutches but has difficulty ambulating with crutches.  Patient denies any fever chills or significant drainage.  Patient was able to follow up with Dr. Jack and has been restarted on antibiotics, Augmentin and Bactrim.         Current Assessment & Plan     Patient remains on antibiotics.               See wound doc progress notes. Documents will be scanned.        Jl ANDERSON Fishman  [facility]

## 2019-12-16 ENCOUNTER — OFFICE VISIT (OUTPATIENT)
Dept: WOUND CARE | Facility: HOSPITAL | Age: 59
End: 2019-12-16
Attending: SURGERY
Payer: COMMERCIAL

## 2019-12-16 DIAGNOSIS — L97.512 NEUROPATHIC ULCER OF FOOT WITH FAT LAYER EXPOSED, RIGHT: ICD-10-CM

## 2019-12-16 PROCEDURE — 99499 NO LOS: ICD-10-PCS | Mod: ,,, | Performed by: SURGERY

## 2019-12-16 PROCEDURE — 27201912 HC WOUND CARE DEBRIDEMENT SUPPLIES

## 2019-12-16 PROCEDURE — 99499 UNLISTED E&M SERVICE: CPT | Mod: ,,, | Performed by: SURGERY

## 2019-12-16 PROCEDURE — 11042 DBRDMT SUBQ TIS 1ST 20SQCM/<: CPT

## 2019-12-16 NOTE — ASSESSMENT & PLAN NOTE
Wound slowly improving.  Bone palpable at the base of the wound.  No significant drainage.  Wound granulating.  Wound decreased in size.  Continue sharp debridement is needed.  Continue offloading

## 2019-12-16 NOTE — PROGRESS NOTES
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  Patient developed worsening wound infection and was admitted to the hospital and started on IV antibiotics.  She underwent debridement and states she was treated for osteomyelitis.  She was discharged to LTAC facility where she received 2 weeks of IV antibiotics.  Patient remains on oral antibiotics and has follow-up scheduled with Infectious Disease.  Patient had been receiving Dakin's dressing changes.  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 Patient also has a foot deformity.  Patient's had been attempting offloading with crutches but has difficulty ambulating with crutches.  She has been able to obtain a knee walker for use.  Patient denies any fever chills or significant drainage.           Current Assessment & Plan     Wound slowly improving.  Bone palpable at the base of the wound.  No significant drainage.  Wound granulating.  Wound decreased in size.  Continue sharp debridement is needed.  Continue offloading               See wound doc progress notes. Documents will be scanned.        Delfin Silva  [facility]

## 2019-12-17 VITALS — SYSTOLIC BLOOD PRESSURE: 122 MMHG | RESPIRATION RATE: 18 BRPM | HEART RATE: 82 BPM | DIASTOLIC BLOOD PRESSURE: 80 MMHG

## 2019-12-23 ENCOUNTER — OFFICE VISIT (OUTPATIENT)
Dept: WOUND CARE | Facility: HOSPITAL | Age: 59
End: 2019-12-23
Attending: SURGERY
Payer: COMMERCIAL

## 2019-12-23 VITALS — RESPIRATION RATE: 20 BRPM | SYSTOLIC BLOOD PRESSURE: 116 MMHG | DIASTOLIC BLOOD PRESSURE: 86 MMHG | HEART RATE: 85 BPM

## 2019-12-23 DIAGNOSIS — L97.512 NEUROPATHIC ULCER OF FOOT WITH FAT LAYER EXPOSED, RIGHT: Primary | ICD-10-CM

## 2019-12-23 DIAGNOSIS — M21.371 FOOT DROP, RIGHT: ICD-10-CM

## 2019-12-23 PROCEDURE — 99499 NO LOS: ICD-10-PCS | Mod: ,,, | Performed by: SURGERY

## 2019-12-23 PROCEDURE — 27201912 HC WOUND CARE DEBRIDEMENT SUPPLIES

## 2019-12-23 PROCEDURE — 11042 DBRDMT SUBQ TIS 1ST 20SQCM/<: CPT

## 2019-12-23 PROCEDURE — 99499 UNLISTED E&M SERVICE: CPT | Mod: ,,, | Performed by: SURGERY

## 2019-12-23 NOTE — ASSESSMENT & PLAN NOTE
Wound improving.  There is no tunneling posteriorly towards the Achilles tendon.  Continue offloading.  Continue debridement to maintain active phase wound healing.  Patient remains on antibiotic therapy for osteomyelitis from Infectious Disease.

## 2019-12-23 NOTE — PROGRESS NOTES
[facility]  Wound Care  Progress Note    Problem List Items Addressed This Visit        Neuro    Foot drop, right    Overview     Patient has chronic right footdrop and has been use in an AFO a prosthetic for a long time.            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  Patient developed worsening wound infection and was admitted to the hospital and started on IV antibiotics.  She underwent debridement and states she was treated for osteomyelitis.  She was discharged to [facility] facility where she received 2 weeks of IV antibiotics.  Patient remains on oral antibiotics and has follow-up scheduled with Infectious Disease.  Patient had been receiving Dakin's dressing changes.  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 Patient also has a foot deformity.  Patient's had been attempting offloading with crutches but has difficulty ambulating with crutches.  She has been able to obtain a knee walker for use.  Patient denies any fever chills or significant drainage.           Current Assessment & Plan     Wound improving.  There is no tunneling posteriorly towards the Achilles tendon.  Continue offloading.  Continue debridement to maintain active phase wound healing.  Patient remains on antibiotic therapy for osteomyelitis from Infectious Disease.               See wound doc progress notes. Documents will be scanned.        Jl Fishman  [facility]

## 2019-12-30 ENCOUNTER — OFFICE VISIT (OUTPATIENT)
Dept: WOUND CARE | Facility: HOSPITAL | Age: 59
End: 2019-12-30
Attending: SURGERY
Payer: COMMERCIAL

## 2019-12-30 VITALS — SYSTOLIC BLOOD PRESSURE: 136 MMHG | HEART RATE: 84 BPM | DIASTOLIC BLOOD PRESSURE: 90 MMHG | RESPIRATION RATE: 20 BRPM

## 2019-12-30 DIAGNOSIS — L97.512 NEUROPATHIC ULCER OF FOOT WITH FAT LAYER EXPOSED, RIGHT: ICD-10-CM

## 2019-12-30 PROCEDURE — 99499 UNLISTED E&M SERVICE: CPT | Mod: ,,, | Performed by: SURGERY

## 2019-12-30 PROCEDURE — 99499 NO LOS: ICD-10-PCS | Mod: ,,, | Performed by: SURGERY

## 2019-12-30 PROCEDURE — 11042 DBRDMT SUBQ TIS 1ST 20SQCM/<: CPT

## 2019-12-30 PROCEDURE — 27201912 HC WOUND CARE DEBRIDEMENT SUPPLIES

## 2019-12-30 NOTE — PROGRESS NOTES
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  Patient developed worsening wound infection and was admitted to the hospital and started on IV antibiotics.  She underwent debridement and states she was treated for osteomyelitis.  She was discharged to LTAC facility where she received 2 weeks of IV antibiotics.  Patient remains on oral antibiotics and has follow-up scheduled with Infectious Disease.  Patient had been receiving Dakin's dressing changes.  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 Patient also has a foot deformity.  Patient's had been attempting offloading with crutches but has difficulty ambulating with crutches.  She has been able to obtain a knee walker for use.  Patient denies any fever chills or significant drainage.           Current Assessment & Plan     Patient remains on oral antibiotics.  She has follow-up with Infectious Disease next week.  There is no significant drainage.  There are areas of granulation.  There is palpable bone but is not appear to be exposed.  There is a sinus tract at 12:00 p.m..  There is no sign of infection.  Will continue to pack with silver rope in offload.  Complete course of antibiotics               See wound doc progress notes. Documents will be scanned.        Delfin Silva  [facility]

## 2019-12-30 NOTE — ASSESSMENT & PLAN NOTE
Patient remains on oral antibiotics.  She has follow-up with Infectious Disease next week.  There is no significant drainage.  There are areas of granulation.  There is palpable bone but is not appear to be exposed.  There is a sinus tract at 12:00 p.m..  There is no sign of infection.  Will continue to pack with silver rope in offload.  Complete course of antibiotics

## 2020-01-06 ENCOUNTER — OFFICE VISIT (OUTPATIENT)
Dept: WOUND CARE | Facility: HOSPITAL | Age: 60
End: 2020-01-06
Attending: SURGERY
Payer: COMMERCIAL

## 2020-01-06 VITALS
DIASTOLIC BLOOD PRESSURE: 104 MMHG | HEART RATE: 66 BPM | SYSTOLIC BLOOD PRESSURE: 143 MMHG | TEMPERATURE: 98 F | RESPIRATION RATE: 16 BRPM

## 2020-01-06 DIAGNOSIS — L97.512 NEUROPATHIC ULCER OF FOOT WITH FAT LAYER EXPOSED, RIGHT: Primary | ICD-10-CM

## 2020-01-06 DIAGNOSIS — M21.371 FOOT DROP, RIGHT: ICD-10-CM

## 2020-01-06 DIAGNOSIS — M86.171 ACUTE OSTEOMYELITIS OF RIGHT FOOT: ICD-10-CM

## 2020-01-06 PROCEDURE — 11042 DBRDMT SUBQ TIS 1ST 20SQCM/<: CPT

## 2020-01-06 PROCEDURE — 99499 NO LOS: ICD-10-PCS | Mod: ,,, | Performed by: SURGERY

## 2020-01-06 PROCEDURE — 27201912 HC WOUND CARE DEBRIDEMENT SUPPLIES

## 2020-01-06 PROCEDURE — 99499 UNLISTED E&M SERVICE: CPT | Mod: ,,, | Performed by: SURGERY

## 2020-01-06 NOTE — PROGRESS NOTES
[facility]  Wound Care  Progress Note    Problem List Items Addressed This Visit        Neuro    Foot drop, right    Overview     Patient has chronic right footdrop and has been use in an AFO a prosthetic for a long time.            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  Patient developed worsening wound infection and was admitted to the hospital and started on IV antibiotics.  She underwent debridement and states she was treated for osteomyelitis.  She was discharged to LTAC facility where she received 2 weeks of IV antibiotics.  Patient remains on oral antibiotics and has follow-up scheduled with Infectious Disease.  Patient had been receiving Dakin's dressing changes.  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 Patient also has a foot deformity.  Patient's had been attempting offloading with crutches but has difficulty ambulating with crutches.  She has been able to obtain a knee walker for use.  Patient denies any fever chills or significant drainage.           Current Assessment & Plan     Wound is improving with decreased size of underlying cavity.  Continue debridement to maintain active phase wound healing.  Patient may return to work with use of knee walker for offloading.            ID    Acute osteomyelitis of right foot    Overview     Patient sustained trauma to her right heel and developed an open wound several months ago.  Patient developed worsening wound infection and was admitted to the hospital and started on IV antibiotics.  She underwent debridement and states she was treated for osteomyelitis.  She was discharged to LTAC facility where she received 2 weeks of IV antibiotics.  Patient remains on oral antibiotics and has follow-up scheduled with Infectious Disease.  Patient had been  receiving Dakin's dressing changes.  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 Patient also has a foot deformity.  Patient's has been attempting offloading with crutches but has difficulty ambulating with crutches.  Patient denies any fever chills or significant drainage.  Patient was able to follow up with Dr. Jack and has been restarted on antibiotics, Augmentin and Bactrim.         Current Assessment & Plan     Patient has follow-up with Dr. Jack today.  There is no sign of bone exposure.  Wound overall continues to improve               See wound doc progress notes. Documents will be scanned.        Jl ANDERSNO Fishman  [facility]

## 2020-01-06 NOTE — ASSESSMENT & PLAN NOTE
Wound is improving with decreased size of underlying cavity.  Continue debridement to maintain active phase wound healing.  Patient may return to work with use of knee walker for offloading.

## 2020-01-06 NOTE — ASSESSMENT & PLAN NOTE
Patient has follow-up with Dr. Jack today.  There is no sign of bone exposure.  Wound overall continues to improve

## 2020-01-13 ENCOUNTER — OFFICE VISIT (OUTPATIENT)
Dept: WOUND CARE | Facility: HOSPITAL | Age: 60
End: 2020-01-13
Attending: SURGERY
Payer: COMMERCIAL

## 2020-01-13 VITALS — HEART RATE: 73 BPM | DIASTOLIC BLOOD PRESSURE: 91 MMHG | TEMPERATURE: 98 F | SYSTOLIC BLOOD PRESSURE: 152 MMHG

## 2020-01-13 DIAGNOSIS — L97.512 NEUROPATHIC ULCER OF FOOT WITH FAT LAYER EXPOSED, RIGHT: ICD-10-CM

## 2020-01-13 PROCEDURE — 27201912 HC WOUND CARE DEBRIDEMENT SUPPLIES

## 2020-01-13 PROCEDURE — 99499 UNLISTED E&M SERVICE: CPT | Mod: ,,, | Performed by: SURGERY

## 2020-01-13 PROCEDURE — 99499 NO LOS: ICD-10-PCS | Mod: ,,, | Performed by: SURGERY

## 2020-01-13 PROCEDURE — 11042 DBRDMT SUBQ TIS 1ST 20SQCM/<: CPT

## 2020-01-13 NOTE — ASSESSMENT & PLAN NOTE
Tract has decreased in depth.  Wound remains clean and granulating.  There is no sign of infection.  Continue silver dressing and offloading.

## 2020-01-13 NOTE — PROGRESS NOTES
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  Patient developed worsening wound infection and was admitted to the hospital and started on IV antibiotics.  She underwent debridement and states she was treated for osteomyelitis.  She was discharged to LTAC facility where she received 2 weeks of IV antibiotics.  Patient remains on oral antibiotics and has follow-up scheduled with Infectious Disease.  Patient had been receiving Dakin's dressing changes.  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 Patient also has a foot deformity.  Patient's had been attempting offloading with crutches but has difficulty ambulating with crutches.  She has been able to obtain a knee walker for use.  Patient denies any fever chills or significant drainage.           Current Assessment & Plan     Tract has decreased in depth.  Wound remains clean and granulating.  There is no sign of infection.  Continue silver dressing and offloading.               See wound doc progress notes. Documents will be scanned.        Delfin Silva  [facility]

## 2020-01-20 ENCOUNTER — OFFICE VISIT (OUTPATIENT)
Dept: WOUND CARE | Facility: HOSPITAL | Age: 60
End: 2020-01-20
Attending: SURGERY
Payer: COMMERCIAL

## 2020-01-20 VITALS
SYSTOLIC BLOOD PRESSURE: 139 MMHG | DIASTOLIC BLOOD PRESSURE: 90 MMHG | RESPIRATION RATE: 18 BRPM | TEMPERATURE: 98 F | HEART RATE: 73 BPM

## 2020-01-20 DIAGNOSIS — M86.171 ACUTE OSTEOMYELITIS OF RIGHT FOOT: ICD-10-CM

## 2020-01-20 DIAGNOSIS — L97.512 NEUROPATHIC ULCER OF FOOT WITH FAT LAYER EXPOSED, RIGHT: Primary | ICD-10-CM

## 2020-01-20 DIAGNOSIS — M21.371 FOOT DROP, RIGHT: ICD-10-CM

## 2020-01-20 PROCEDURE — 11042 DBRDMT SUBQ TIS 1ST 20SQCM/<: CPT

## 2020-01-20 PROCEDURE — 99499 NO LOS: ICD-10-PCS | Mod: ,,, | Performed by: SURGERY

## 2020-01-20 PROCEDURE — 99499 UNLISTED E&M SERVICE: CPT | Mod: ,,, | Performed by: SURGERY

## 2020-01-20 PROCEDURE — 27201912 HC WOUND CARE DEBRIDEMENT SUPPLIES

## 2020-01-20 NOTE — ASSESSMENT & PLAN NOTE
Wound continues to improve.  Continue debridement to maintain active phase wound healing.  Continue offloading with knee walker.  Patient is anticipating returning to work.

## 2020-01-20 NOTE — PROGRESS NOTES
[facility]  Wound Care  Progress Note    Problem List Items Addressed This Visit        Neuro    Foot drop, right    Overview     Patient has chronic right footdrop and has been use in an AFO a prosthetic for a long time.            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  Patient developed worsening wound infection and was admitted to the hospital and started on IV antibiotics.  She underwent debridement and states she was treated for osteomyelitis.  She was discharged to LTAC facility where she received 2 weeks of IV antibiotics.  Patient remains on oral antibiotics and has follow-up scheduled with Infectious Disease.  Patient had been receiving Dakin's dressing changes.  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 Patient also has a foot deformity.  Patient's had been attempting offloading with crutches but has difficulty ambulating with crutches.  She has been able to obtain a knee walker for use.  Patient denies any fever chills or significant drainage.           Current Assessment & Plan     Wound continues to improve.  Continue debridement to maintain active phase wound healing.  Continue offloading with knee walker.  Patient is anticipating returning to work.            ID    Acute osteomyelitis of right foot    Overview     Patient sustained trauma to her right heel and developed an open wound several months ago.  Patient developed worsening wound infection and was admitted to the hospital and started on IV antibiotics.  She underwent debridement and states she was treated for osteomyelitis.  She was discharged to LTAC facility where she received 2 weeks of IV antibiotics.  Patient remains on oral antibiotics and has follow-up scheduled with Infectious Disease.  Patient had been receiving Dakin's dressing  changes.  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 Patient also has a foot deformity.  Patient's has been attempting offloading with crutches but has difficulty ambulating with crutches.  Patient denies any fever chills or significant drainage.  Patient was able to follow up with Dr. Jack and has been restarted on antibiotics, Augmentin and Bactrim.         Current Assessment & Plan     Patient remains on antibiotic therapy.               See wound doc progress notes. Documents will be scanned.        Jl Fishman  [facility]

## 2020-01-31 ENCOUNTER — OFFICE VISIT (OUTPATIENT)
Dept: WOUND CARE | Facility: HOSPITAL | Age: 60
End: 2020-01-31
Attending: SURGERY
Payer: COMMERCIAL

## 2020-01-31 VITALS
SYSTOLIC BLOOD PRESSURE: 128 MMHG | RESPIRATION RATE: 19 BRPM | HEART RATE: 92 BPM | DIASTOLIC BLOOD PRESSURE: 100 MMHG | TEMPERATURE: 98 F

## 2020-01-31 DIAGNOSIS — L97.412: ICD-10-CM

## 2020-01-31 PROCEDURE — 11042 DBRDMT SUBQ TIS 1ST 20SQCM/<: CPT

## 2020-01-31 PROCEDURE — 87186 SC STD MICRODIL/AGAR DIL: CPT

## 2020-01-31 PROCEDURE — 27201912 HC WOUND CARE DEBRIDEMENT SUPPLIES

## 2020-01-31 PROCEDURE — 87070 CULTURE OTHR SPECIMN AEROBIC: CPT

## 2020-01-31 PROCEDURE — 87077 CULTURE AEROBIC IDENTIFY: CPT

## 2020-01-31 NOTE — PROGRESS NOTES
[facility]  Wound Care  Progress Note    Problem List Items Addressed This Visit        Derm    Neuropathic ulcer of heel, right, with fat layer exposed    Overview       HPI:  Patient sustained trauma to her right heel and developed an open wound several months ago.  Patient developed worsening wound infection and was admitted to the hospital and started on IV antibiotics.  She underwent debridement and states she was treated for osteomyelitis.  She was discharged to LTAC facility where she received 2 weeks of IV antibiotics.  Patient remains on oral antibiotics and has follow-up scheduled with Infectious Disease.  Patient had been receiving Dakin's dressing changes.  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 Patient also has a foot deformity.  Patient's had been attempting offloading with crutches but has difficulty ambulating with crutches.  She has been able to obtain a knee walker for use.  Patient denies any fever chills or significant drainage.      Location: right plantar heel    Duration: 10-1-19    Context: neuropathic    Associated Signs & Symptoms: pt c/o pain    Timing: random    Severity: 9/10 at times    Quality: sharp and burning    Modifying Factors: nothing                   Physical Exam   Constitutional: She is oriented to person, place, and time. She appears well-developed and well-nourished.   HENT:   Head: Normocephalic.   Pulmonary/Chest: Effort normal.   Musculoskeletal:   Right foot drop   Neurological: She is alert and oriented to person, place, and time.   Skin: Skin is warm and dry.   Neuropathic ulcer to right plantar foot with adipose exposed   Psychiatric: She has a normal mood and affect. Her behavior is normal. Judgment and thought content normal.     Ulcer debrided, still with tunneling, will continue to pack with silver alginate, culture obtained due to non-healing nature of the wound, will recheck in one week,  offload pressure at all times with knee scooter, keep clean and dry, will obtain records from ID, pt is still on antibiotics and has labs coming up.  See wound doc progress notes. Documents will be scanned.        Donna DickensBoeuf  [facility]

## 2020-02-03 LAB — BACTERIA SPEC AEROBE CULT: ABNORMAL

## 2020-02-07 ENCOUNTER — OFFICE VISIT (OUTPATIENT)
Dept: WOUND CARE | Facility: HOSPITAL | Age: 60
End: 2020-02-07
Attending: SURGERY
Payer: COMMERCIAL

## 2020-02-07 DIAGNOSIS — M86.171 ACUTE OSTEOMYELITIS OF RIGHT FOOT: ICD-10-CM

## 2020-02-07 DIAGNOSIS — L97.412: Primary | ICD-10-CM

## 2020-02-07 PROCEDURE — 27201912 HC WOUND CARE DEBRIDEMENT SUPPLIES

## 2020-02-07 PROCEDURE — 11042 DBRDMT SUBQ TIS 1ST 20SQCM/<: CPT
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
## 2020-02-10 VITALS
SYSTOLIC BLOOD PRESSURE: 125 MMHG | DIASTOLIC BLOOD PRESSURE: 83 MMHG | TEMPERATURE: 98 F | HEART RATE: 62 BPM | RESPIRATION RATE: 20 BRPM

## 2020-02-14 ENCOUNTER — OFFICE VISIT (OUTPATIENT)
Dept: WOUND CARE | Facility: HOSPITAL | Age: 60
End: 2020-02-14
Attending: SURGERY
Payer: COMMERCIAL

## 2020-02-14 VITALS
SYSTOLIC BLOOD PRESSURE: 129 MMHG | DIASTOLIC BLOOD PRESSURE: 86 MMHG | TEMPERATURE: 98 F | HEART RATE: 85 BPM | RESPIRATION RATE: 18 BRPM

## 2020-02-14 DIAGNOSIS — M86.171 ACUTE OSTEOMYELITIS OF RIGHT FOOT: ICD-10-CM

## 2020-02-14 DIAGNOSIS — L97.412: Primary | ICD-10-CM

## 2020-02-14 PROCEDURE — 11042 DBRDMT SUBQ TIS 1ST 20SQCM/<: CPT

## 2020-02-14 PROCEDURE — 27201912 HC WOUND CARE DEBRIDEMENT SUPPLIES

## 2020-02-14 NOTE — PROGRESS NOTES
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  Patient developed worsening wound infection and was admitted to the hospital and started on IV antibiotics.  She underwent debridement and states she was treated for osteomyelitis.  She was discharged to LTAC facility where she received 2 weeks of IV antibiotics.  Patient remains on oral antibiotics and has follow-up scheduled with Infectious Disease.  Patient had been receiving Dakin's dressing changes.  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 Patient also has a foot deformity.  Patient's had been attempting offloading with crutches but has difficulty ambulating with crutches.  She has been able to obtain a knee walker for use.  Patient denies any fever chills or significant drainage.      Location: right plantar heel    Duration: 10-1-19    Context: neuropathic, pt states her feeling comes and goes.    Associated Signs & Symptoms: pt c/o pain    Timing: random    Severity: 9/10 at times    Quality: sharp and burning    Modifying Factors: nothing    2-7-20: here for wound care for neuropathic ulcer to right plantar heel  2-14-20: here for wound care for neuropathic ulcer to right plantar heel              ID    Acute osteomyelitis of right foot    Overview     Patient sustained trauma to her right heel and developed an open wound several months ago.  Patient developed worsening wound infection and was admitted to the hospital and started on IV antibiotics.  She underwent debridement and states she was treated for osteomyelitis.  She was discharged to LTAC facility where she received 2 weeks of IV antibiotics.  Patient remains on oral antibiotics and has follow-up scheduled  with Infectious Disease.  Patient had been receiving Dakin's dressing changes.  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 Patient also has a foot deformity.  Patient's has been attempting offloading with crutches but has difficulty ambulating with crutches.  Patient denies any fever chills or significant drainage.  Patient was able to follow up with Dr. Jack and has been restarted on antibiotics, Augmentin and Bactrim.             Physical Exam   Constitutional: She is oriented to person, place, and time. She appears well-developed and well-nourished.   HENT:   Head: Normocephalic.   Pulmonary/Chest: Effort normal.   Musculoskeletal: Normal range of motion.   Neurological: She is alert and oriented to person, place, and time.   Skin: Skin is warm and dry.   Neuropathic ulcer to right plantar heel with adipose exposed.   Psychiatric: She has a normal mood and affect. Her behavior is normal. Judgment normal.   Vitals reviewed.    Ulcer debrided, bigger today, pt has f/u with ID coming up, if wound does not improve may need to refer to surgeon, will continue topical abx daily and will recheck in one week.  See wound doc progress notes. Documents will be scanned.        Laina LeBoeuf [facility]

## 2020-02-21 ENCOUNTER — OFFICE VISIT (OUTPATIENT)
Dept: WOUND CARE | Facility: HOSPITAL | Age: 60
End: 2020-02-21
Attending: SURGERY
Payer: COMMERCIAL

## 2020-02-21 VITALS
TEMPERATURE: 98 F | SYSTOLIC BLOOD PRESSURE: 135 MMHG | DIASTOLIC BLOOD PRESSURE: 84 MMHG | RESPIRATION RATE: 18 BRPM | HEART RATE: 68 BPM

## 2020-02-21 DIAGNOSIS — L97.412: Primary | ICD-10-CM

## 2020-02-21 DIAGNOSIS — M86.171 ACUTE OSTEOMYELITIS OF RIGHT FOOT: ICD-10-CM

## 2020-02-21 PROCEDURE — 11042 DBRDMT SUBQ TIS 1ST 20SQCM/<: CPT

## 2020-02-21 PROCEDURE — 27201912 HC WOUND CARE DEBRIDEMENT SUPPLIES

## 2020-02-21 NOTE — PROGRESS NOTES
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  Patient developed worsening wound infection and was admitted to the hospital and started on IV antibiotics.  She underwent debridement and states she was treated for osteomyelitis.  She was discharged to LTAC facility where she received 2 weeks of IV antibiotics.  Patient remains on oral antibiotics and has follow-up scheduled with Infectious Disease.  Patient had been receiving Dakin's dressing changes.  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 Patient also has a foot deformity.  Patient's had been attempting offloading with crutches but has difficulty ambulating with crutches.  She has been able to obtain a knee walker for use.  Patient denies any fever chills or significant drainage.      Location: right plantar heel    Duration: 10-1-19    Context: neuropathic, pt states her feeling comes and goes.    Associated Signs & Symptoms: pt c/o pain    Timing: random    Severity: 9/10 at times    Quality: sharp and burning    Modifying Factors: nothing    2-7-20: here for wound care for neuropathic ulcer to right plantar heel  2-14-20: here for wound care for neuropathic ulcer to right plantar heel  2-21-20: here for wound care for neuropathic ulcer to right plantar heel            ID    Acute osteomyelitis of right foot    Overview     Patient sustained trauma to her right heel and developed an open wound several months ago.  Patient developed worsening wound infection and was admitted to the hospital and started on IV antibiotics.  She underwent debridement and states she was treated for osteomyelitis.  She was discharged to LTAC facility where she received 2 weeks of IV  antibiotics.  Patient remains on oral antibiotics and has follow-up scheduled with Infectious Disease.  Patient had been receiving Dakin's dressing changes.  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 Patient also has a foot deformity.  Patient's has been attempting offloading with crutches but has difficulty ambulating with crutches.  Patient denies any fever chills or significant drainage.  Patient was able to follow up with Dr. Jack and has been restarted on antibiotics, Augmentin and Bactrim.             Physical Exam   Constitutional: She is oriented to person, place, and time. She appears well-developed and well-nourished.   HENT:   Head: Normocephalic.   Pulmonary/Chest: Effort normal.   Musculoskeletal: Normal range of motion.   Pt offloads pressure with knee scooter   Neurological: She is alert and oriented to person, place, and time.   Skin: Skin is warm and dry.   Neuropathic ulcer to right plantar heel with adipose exposed. Pt has foot drop to that foot due to an old spinal injury.   Psychiatric: She has a normal mood and affect. Her behavior is normal. Judgment and thought content normal.   Vitals reviewed.    Ulcer debrided, will continue topical abx and calcium alginate daily, offload pressure at all times with knee scooter, will recheck in one week.  See wound doc progress notes. Documents will be scanned.        Donna DickensBoeuf  [facility]

## 2020-02-28 ENCOUNTER — OFFICE VISIT (OUTPATIENT)
Dept: WOUND CARE | Facility: HOSPITAL | Age: 60
End: 2020-02-28
Attending: SURGERY
Payer: COMMERCIAL

## 2020-02-28 VITALS
RESPIRATION RATE: 18 BRPM | DIASTOLIC BLOOD PRESSURE: 87 MMHG | TEMPERATURE: 98 F | SYSTOLIC BLOOD PRESSURE: 125 MMHG | HEART RATE: 69 BPM

## 2020-02-28 DIAGNOSIS — M86.171 ACUTE OSTEOMYELITIS OF RIGHT FOOT: ICD-10-CM

## 2020-02-28 DIAGNOSIS — L97.412: Primary | ICD-10-CM

## 2020-02-28 PROCEDURE — 27201912 HC WOUND CARE DEBRIDEMENT SUPPLIES

## 2020-02-28 PROCEDURE — 11042 DBRDMT SUBQ TIS 1ST 20SQCM/<: CPT

## 2020-02-28 NOTE — PROGRESS NOTES
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  Patient developed worsening wound infection and was admitted to the hospital and started on IV antibiotics.  She underwent debridement and states she was treated for osteomyelitis.  She was discharged to LTAC facility where she received 2 weeks of IV antibiotics.  Patient remains on oral antibiotics and has follow-up scheduled with Infectious Disease.  Patient had been receiving Dakin's dressing changes.  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 Patient also has a foot deformity.  Patient's had been attempting offloading with crutches but has difficulty ambulating with crutches.  She has been able to obtain a knee walker for use.  Patient denies any fever chills or significant drainage.      Location: right plantar heel    Duration: 10-1-19    Context: neuropathic, pt states her feeling comes and goes.    Associated Signs & Symptoms: pt c/o pain    Timing: random    Severity: 9/10 at times    Quality: sharp and burning    Modifying Factors: nothing    2-7-20: here for wound care for neuropathic ulcer to right plantar heel  2-14-20: here for wound care for neuropathic ulcer to right plantar heel  2-21-20: here for wound care for neuropathic ulcer to right plantar heel  2-28-20: here for wound care for neuropathic ulcer to right plantar heel.            ID    Acute osteomyelitis of right foot    Overview     Patient sustained trauma to her right heel and developed an open wound several months ago.  Patient developed worsening wound infection and was admitted to the hospital and started on IV antibiotics.  She underwent debridement and states she was treated for osteomyelitis.   She was discharged to LTAC facility where she received 2 weeks of IV antibiotics.  Patient remains on oral antibiotics and has follow-up scheduled with Infectious Disease.  Patient had been receiving Dakin's dressing changes.  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 Patient also has a foot deformity.  Patient's has been attempting offloading with crutches but has difficulty ambulating with crutches.  Patient denies any fever chills or significant drainage.  Patient was able to follow up with Dr. Jack and has been restarted on antibiotics, Augmentin and Bactrim.             Physical Exam   Constitutional: She is oriented to person, place, and time. She appears well-developed and well-nourished.   HENT:   Head: Normocephalic.   Pulmonary/Chest: Effort normal.   Musculoskeletal: Normal range of motion.   Neurological: She is alert and oriented to person, place, and time.   Skin: Skin is warm and dry.   Neuropathic ulcer to right plantar heel with adipose exposed.    Psychiatric: She has a normal mood and affect. Her behavior is normal. Judgment and thought content normal.     Ulcer debrided, pt has f/u with ID next week, still on abx, will continue topical abx daily, offload pressure at all times with knee scooter, will recheck in one week.  See wound doc progress notes. Documents will be scanned.        Donna Russell  [facility]

## 2020-03-06 ENCOUNTER — OFFICE VISIT (OUTPATIENT)
Dept: WOUND CARE | Facility: HOSPITAL | Age: 60
End: 2020-03-06
Attending: NURSE PRACTITIONER
Payer: COMMERCIAL

## 2020-03-06 VITALS
SYSTOLIC BLOOD PRESSURE: 127 MMHG | DIASTOLIC BLOOD PRESSURE: 89 MMHG | HEART RATE: 68 BPM | TEMPERATURE: 98 F | RESPIRATION RATE: 20 BRPM

## 2020-03-06 DIAGNOSIS — M86.171 ACUTE OSTEOMYELITIS OF RIGHT FOOT: Primary | ICD-10-CM

## 2020-03-06 DIAGNOSIS — L97.412: ICD-10-CM

## 2020-03-06 PROCEDURE — 27201912 HC WOUND CARE DEBRIDEMENT SUPPLIES

## 2020-03-06 PROCEDURE — 11042 DBRDMT SUBQ TIS 1ST 20SQCM/<: CPT

## 2020-03-06 NOTE — PROGRESS NOTES
[facility]  Wound Care  Progress Note    Problem List Items Addressed This Visit        Derm    Neuropathic ulcer of heel, right, with fat layer exposed    Overview       HPI:  Patient sustained trauma to her right heel and developed an open wound several months ago.  Patient developed worsening wound infection and was admitted to the hospital and started on IV antibiotics.  She underwent debridement and states she was treated for osteomyelitis.  She was discharged to [facility] facility where she received 2 weeks of IV antibiotics.  Patient remains on oral antibiotics and has follow-up scheduled with Infectious Disease.  Patient had been receiving Dakin's dressing changes.  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 Patient also has a foot deformity.  Patient's had been attempting offloading with crutches but has difficulty ambulating with crutches.  She has been able to obtain a knee walker for use.  Patient denies any fever chills or significant drainage.      Location: right plantar heel    Duration: 10-1-19    Context: neuropathic, pt states her feeling comes and goes.    Associated Signs & Symptoms: pt c/o pain    Timing: random    Severity: 9/10 at times    Quality: sharp and burning    Modifying Factors: nothing    2-7-20: here for wound care for neuropathic ulcer to right plantar heel  2-14-20: here for wound care for neuropathic ulcer to right plantar heel  2-21-20: here for wound care for neuropathic ulcer to right plantar heel  2-28-20: here for wound care for neuropathic ulcer to right plantar heel.  3-6-20: here for wound care for neuropathic ulcer to her right plantar heel.            ID    Acute osteomyelitis of right foot - Primary    Overview     Patient sustained trauma to her right heel and developed an open wound several months ago.  Patient developed worsening wound infection and was admitted to the hospital and started on IV  antibiotics.  She underwent debridement and states she was treated for osteomyelitis.  She was discharged to [facility]AC facility where she received 2 weeks of IV antibiotics.  Patient remains on oral antibiotics and has follow-up scheduled with Infectious Disease.  Patient had been receiving Dakin's dressing changes.  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 Patient also has a foot deformity.  Patient's has been attempting offloading with crutches but has difficulty ambulating with crutches.  Patient denies any fever chills or significant drainage.  Patient was able to follow up with Dr. Jack and has been restarted on antibiotics, Augmentin and Bactrim.           Physical Exam   Constitutional: She is oriented to person, place, and time. She appears well-developed and well-nourished.   HENT:   Head: Normocephalic.   Musculoskeletal: Normal range of motion.   Neurological: She is alert and oriented to person, place, and time.   Skin: Skin is warm and dry.   Neuropathic ulcer to right plantar heel with adipose exposed.    Psychiatric: She has a normal mood and affect. Her behavior is normal. Judgment and thought content normal.     Ulcer debrided, had labs drawn today and saw ID last week, awaiting labs to decide whether or not to continue antibiotics. Will continue topical abx with calcium alginate daily, offload pressure at all times, will recheck in one week.    See wound doc progress notes. Documents will be scanned.        Donna LeBoeuf [facility]

## 2020-03-20 ENCOUNTER — OFFICE VISIT (OUTPATIENT)
Dept: WOUND CARE | Facility: HOSPITAL | Age: 60
End: 2020-03-20
Attending: NURSE PRACTITIONER
Payer: COMMERCIAL

## 2020-03-20 VITALS
TEMPERATURE: 98 F | DIASTOLIC BLOOD PRESSURE: 87 MMHG | RESPIRATION RATE: 18 BRPM | SYSTOLIC BLOOD PRESSURE: 137 MMHG | HEART RATE: 65 BPM

## 2020-03-20 DIAGNOSIS — M86.171 ACUTE OSTEOMYELITIS OF RIGHT FOOT: ICD-10-CM

## 2020-03-20 DIAGNOSIS — L97.412: Primary | ICD-10-CM

## 2020-03-20 PROCEDURE — 27201912 HC WOUND CARE DEBRIDEMENT SUPPLIES

## 2020-03-20 PROCEDURE — 11042 DBRDMT SUBQ TIS 1ST 20SQCM/<: CPT

## 2020-03-20 NOTE — PROGRESS NOTES
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  Patient developed worsening wound infection and was admitted to the hospital and started on IV antibiotics.  She underwent debridement and states she was treated for osteomyelitis.  She was discharged to LTAC facility where she received 2 weeks of IV antibiotics.  Patient remains on oral antibiotics and has follow-up scheduled with Infectious Disease.  Patient had been receiving Dakin's dressing changes.  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 Patient also has a foot deformity.  Patient's had been attempting offloading with crutches but has difficulty ambulating with crutches.  She has been able to obtain a knee walker for use.  Patient denies any fever chills or significant drainage.      Location: right plantar heel    Duration: 10-1-19    Context: neuropathic, pt states her feeling comes and goes.    Associated Signs & Symptoms: pt c/o pain    Timing: random    Severity: 9/10 at times    Quality: sharp and burning    Modifying Factors: nothing    2-7-20: here for wound care for neuropathic ulcer to right plantar heel  2-14-20: here for wound care for neuropathic ulcer to right plantar heel  2-21-20: here for wound care for neuropathic ulcer to right plantar heel  2-28-20: here for wound care for neuropathic ulcer to right plantar heel.  3-6-20: here for wound care for neuropathic ulcer to her right plantar heel.  3-20-20: here for wound care for neuropathic ulcer to her right plantar heel.            ID    Acute osteomyelitis of right foot    Overview     Patient sustained trauma to her right heel and developed an open wound several months ago.  Patient developed  worsening wound infection and was admitted to the hospital and started on IV antibiotics.  She underwent debridement and states she was treated for osteomyelitis.  She was discharged to LTAC facility where she received 2 weeks of IV antibiotics.  Patient remains on oral antibiotics and has follow-up scheduled with Infectious Disease.  Patient had been receiving Dakin's dressing changes.  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 Patient also has a foot deformity.  Patient's has been attempting offloading with crutches but has difficulty ambulating with crutches.  Patient denies any fever chills or significant drainage.  Patient was able to follow up with Dr. Jack and has been restarted on antibiotics, Augmentin and Bactrim.             Physical Exam   Constitutional: She is oriented to person, place, and time. She appears well-developed and well-nourished.   HENT:   Head: Normocephalic.   Pulmonary/Chest: Effort normal.   Musculoskeletal: Normal range of motion.   Neurological: She is alert and oriented to person, place, and time.   Skin: Skin is warm and dry.   Right plantar heel neuropathic ulcer with adipose exposed.   Psychiatric: She has a normal mood and affect. Her behavior is normal. Judgment and thought content normal.     Ulcer is slightly larger, missed last appt. Ulcer debrided, will continue topical abx and alginate daily, pt is on amoxicillin and bactrim for one more month, pt sees Dr. Jack at least monthly. Will recheck in one week. t is medically necessary to see pt weekly to avoid wound deterioration.     See wound doc progress notes. Documents will be scanned.        Donna Yvonne  [facility]

## 2020-03-27 ENCOUNTER — OFFICE VISIT (OUTPATIENT)
Dept: WOUND CARE | Facility: HOSPITAL | Age: 60
End: 2020-03-27
Attending: NURSE PRACTITIONER
Payer: COMMERCIAL

## 2020-03-27 VITALS
RESPIRATION RATE: 18 BRPM | TEMPERATURE: 97 F | SYSTOLIC BLOOD PRESSURE: 136 MMHG | DIASTOLIC BLOOD PRESSURE: 84 MMHG | HEART RATE: 86 BPM

## 2020-03-27 DIAGNOSIS — M21.371 FOOT DROP, RIGHT: Primary | ICD-10-CM

## 2020-03-27 DIAGNOSIS — L97.412: ICD-10-CM

## 2020-03-27 PROCEDURE — 11042 DBRDMT SUBQ TIS 1ST 20SQCM/<: CPT

## 2020-03-27 PROCEDURE — 27201912 HC WOUND CARE DEBRIDEMENT SUPPLIES

## 2020-03-27 NOTE — PROGRESS NOTES
[facility]  Wound Care  Progress Note    Problem List Items Addressed This Visit        Neuro    Foot drop, right - Primary    Overview     Patient has chronic right footdrop and has been use in an AFO a prosthetic for a long time.            Derm    Neuropathic ulcer of heel, right, with fat layer exposed    Overview       HPI:  Patient sustained trauma to her right heel and developed an open wound several months ago.  Patient developed worsening wound infection and was admitted to the hospital and started on IV antibiotics.  She underwent debridement and states she was treated for osteomyelitis.  She was discharged to [facility] facility where she received 2 weeks of IV antibiotics.  Patient remains on oral antibiotics and has follow-up scheduled with Infectious Disease.  Patient had been receiving Dakin's dressing changes.  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 Patient also has a foot deformity.  Patient's had been attempting offloading with crutches but has difficulty ambulating with crutches.  She has been able to obtain a knee walker for use.  Patient denies any fever chills or significant drainage.      Location: right plantar heel    Duration: 10-1-19    Context: neuropathic, pt states her feeling comes and goes.    Associated Signs & Symptoms: pt c/o pain    Timing: random    Severity: 9/10 at times    Quality: sharp and burning    Modifying Factors: nothing    2-7-20: here for wound care for neuropathic ulcer to right plantar heel  2-14-20: here for wound care for neuropathic ulcer to right plantar heel  2-21-20: here for wound care for neuropathic ulcer to right plantar heel  2-28-20: here for wound care for neuropathic ulcer to right plantar heel.  3-6-20: here for wound care for neuropathic ulcer to her right plantar heel.  3-20-20: here for wound care for neuropathic ulcer to her right plantar heel.  3-27-20: here for wound care for  neuropathic ulcer of her right plantar heel             Physical Exam   Constitutional: She is oriented to person, place, and time. She appears well-developed and well-nourished.   HENT:   Head: Normocephalic.   Pulmonary/Chest: Effort normal.   Musculoskeletal: Normal range of motion.   Neurological: She is alert and oriented to person, place, and time.   Skin: Skin is warm and dry.   Neuropathic ulcer to right plantar heel with adipose exposed, pt is not diabetic but has neuropathy from a spinal injury.   Psychiatric: She has a normal mood and affect. Her behavior is normal. Judgment and thought content normal.   Vitals reviewed.    Ulcer is larger, debrided today, will continue topical abx and pack with alginate, pt will see one of the surgeons next week to ensure no surgical procedure is needed,    See wound doc progress notes. Documents will be scanned.        Laina LeBoeuf [facility]

## 2020-04-06 ENCOUNTER — OFFICE VISIT (OUTPATIENT)
Dept: WOUND CARE | Facility: HOSPITAL | Age: 60
End: 2020-04-06
Attending: SURGERY
Payer: COMMERCIAL

## 2020-04-06 VITALS
DIASTOLIC BLOOD PRESSURE: 92 MMHG | HEART RATE: 81 BPM | TEMPERATURE: 98 F | SYSTOLIC BLOOD PRESSURE: 132 MMHG | RESPIRATION RATE: 20 BRPM

## 2020-04-06 DIAGNOSIS — L97.412: Primary | ICD-10-CM

## 2020-04-06 DIAGNOSIS — M21.371 FOOT DROP, RIGHT: ICD-10-CM

## 2020-04-06 PROBLEM — M86.171 ACUTE OSTEOMYELITIS OF RIGHT FOOT: Status: RESOLVED | Noted: 2019-11-04 | Resolved: 2020-04-06

## 2020-04-06 PROCEDURE — 99499 UNLISTED E&M SERVICE: CPT | Mod: ,,, | Performed by: SURGERY

## 2020-04-06 PROCEDURE — 11042 DBRDMT SUBQ TIS 1ST 20SQCM/<: CPT

## 2020-04-06 PROCEDURE — 99499 NO LOS: ICD-10-PCS | Mod: ,,, | Performed by: SURGERY

## 2020-04-06 PROCEDURE — 27201912 HC WOUND CARE DEBRIDEMENT SUPPLIES

## 2020-04-06 NOTE — ASSESSMENT & PLAN NOTE
Overall wound cavity is markedly smaller.  There was large amount of callus and thickened dermis.  Debridement performed.  Continue debridement to maintain active phase wound healing.  Continue antibiotic powder.  Continue offloading.

## 2020-04-06 NOTE — PROGRESS NOTES
[facility]  Wound Care  Progress Note    Problem List Items Addressed This Visit        Neuro    Foot drop, right    Overview     Patient has chronic right footdrop and has been use in an AFO a prosthetic for a long time.            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  Patient developed worsening wound infection and was admitted to the hospital and started on IV antibiotics.  She underwent debridement and states she was treated for osteomyelitis.  She was discharged to [facility] facility where she received 2 weeks of IV antibiotics.  Patient remains on oral antibiotics and has follow-up scheduled with Infectious Disease.  Patient had been receiving Dakin's dressing changes.  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 Patient also has a foot deformity.  Patient's had been attempting offloading with crutches but has difficulty ambulating with crutches.  She has been able to obtain a knee walker for use.  Patient denies any fever chills or significant drainage.      Location: right plantar heel    Duration: 10-1-19    Context: neuropathic, pt states her feeling comes and goes.    Associated Signs & Symptoms: pt c/o pain    Timing: random    Severity: 9/10 at times    Quality: sharp and burning    Modifying Factors: nothing             Current Assessment & Plan     Overall wound cavity is markedly smaller.  There was large amount of callus and thickened dermis.  Debridement performed.  Continue debridement to maintain active phase wound healing.  Continue antibiotic powder.  Continue offloading.               See wound doc progress notes. Documents will be scanned.        Jl Fishman  [facility]

## 2020-04-13 ENCOUNTER — OFFICE VISIT (OUTPATIENT)
Dept: WOUND CARE | Facility: HOSPITAL | Age: 60
End: 2020-04-13
Attending: SURGERY
Payer: COMMERCIAL

## 2020-04-13 VITALS
DIASTOLIC BLOOD PRESSURE: 86 MMHG | TEMPERATURE: 98 F | HEART RATE: 81 BPM | SYSTOLIC BLOOD PRESSURE: 135 MMHG | RESPIRATION RATE: 18 BRPM

## 2020-04-13 DIAGNOSIS — L97.412: ICD-10-CM

## 2020-04-13 PROCEDURE — 99499 UNLISTED E&M SERVICE: CPT | Mod: ,,, | Performed by: SURGERY

## 2020-04-13 PROCEDURE — 27201912 HC WOUND CARE DEBRIDEMENT SUPPLIES

## 2020-04-13 PROCEDURE — 99499 NO LOS: ICD-10-PCS | Mod: ,,, | Performed by: SURGERY

## 2020-04-13 PROCEDURE — 11042 DBRDMT SUBQ TIS 1ST 20SQCM/<: CPT

## 2020-04-13 NOTE — PROGRESS NOTES
[facility]  Wound Care  Progress Note    Problem List Items Addressed This Visit     Neuropathic ulcer of heel, right, with fat layer exposed    Overview       HPI:  Patient sustained trauma to her right heel and developed an open wound several months ago.  Patient developed worsening wound infection and was admitted to the hospital and started on IV antibiotics.  She underwent debridement and states she was treated for osteomyelitis.  She was discharged to LTAC facility where she received 2 weeks of IV antibiotics.  Patient remains on oral antibiotics and has follow-up scheduled with Infectious Disease.  Patient had been receiving Dakin's dressing changes.  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 Patient also has a foot deformity.  Patient's had been attempting offloading with crutches but has difficulty ambulating with crutches.  She has been able to obtain a knee walker for use.  Patient denies any fever chills or significant drainage.      Location: right plantar heel    Duration: 10-1-19    Context: neuropathic, pt states her feeling comes and goes.    Associated Signs & Symptoms: pt c/o pain    Timing: random    Severity: 9/10 at times    Quality: sharp and burning    Modifying Factors: nothing             Current Assessment & Plan     Drainage decreased.  Wound not nearly as painful.  There is no exposed bone.  There is no sign of soft tissue infection.  There is some friable granulation tissue present.  Patient remains with a long tract.  Continue silver dressing.  Continue offloading.  If track remains consists consider opening the trach and placing wound VAC.               See wound doc progress notes. Documents will be scanned.        Delfin Silva  [facility]

## 2020-04-13 NOTE — ASSESSMENT & PLAN NOTE
Drainage decreased.  Wound not nearly as painful.  There is no exposed bone.  There is no sign of soft tissue infection.  There is some friable granulation tissue present.  Patient remains with a long tract.  Continue silver dressing.  Continue offloading.  If track remains consists consider opening the trach and placing wound VAC.

## 2020-04-20 ENCOUNTER — OFFICE VISIT (OUTPATIENT)
Dept: WOUND CARE | Facility: HOSPITAL | Age: 60
End: 2020-04-20
Attending: SURGERY
Payer: COMMERCIAL

## 2020-04-20 VITALS
DIASTOLIC BLOOD PRESSURE: 80 MMHG | TEMPERATURE: 98 F | RESPIRATION RATE: 18 BRPM | SYSTOLIC BLOOD PRESSURE: 138 MMHG | HEART RATE: 84 BPM

## 2020-04-20 DIAGNOSIS — L97.412: Primary | ICD-10-CM

## 2020-04-20 DIAGNOSIS — M21.371 FOOT DROP, RIGHT: ICD-10-CM

## 2020-04-20 PROCEDURE — 11042 DBRDMT SUBQ TIS 1ST 20SQCM/<: CPT

## 2020-04-20 PROCEDURE — 27201912 HC WOUND CARE DEBRIDEMENT SUPPLIES

## 2020-04-20 PROCEDURE — 99499 UNLISTED E&M SERVICE: CPT | Mod: ,,, | Performed by: SURGERY

## 2020-04-20 PROCEDURE — 99499 NO LOS: ICD-10-PCS | Mod: ,,, | Performed by: SURGERY

## 2020-04-20 NOTE — PROGRESS NOTES
[facility]  Wound Care  Progress Note    Problem List Items Addressed This Visit        Neuro    Foot drop, right    Overview     Patient has chronic right footdrop and has been use in an AFO a prosthetic for a long time.            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  Patient developed worsening wound infection and was admitted to the hospital and started on IV antibiotics.  She underwent debridement and states she was treated for osteomyelitis.  She was discharged to [facility] facility where she received 2 weeks of IV antibiotics.  Patient remains on oral antibiotics and has follow-up scheduled with Infectious Disease.  Patient had been receiving Dakin's dressing changes.  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 Patient also has a foot deformity.  Patient's had been attempting offloading with crutches but has difficulty ambulating with crutches.  She has been able to obtain a knee walker for use.  Patient denies any fever chills or significant drainage.      Location: right plantar heel    Duration: 10-1-19    Context: neuropathic, pt states her feeling comes and goes.    Associated Signs & Symptoms: pt c/o pain    Timing: random    Severity: 9/10 at times    Quality: sharp and burning    Modifying Factors: nothing    As of 04/20/2020, pain has resolved.             Current Assessment & Plan     Wound continues to make progress.  Continue debridement to maintain active phase wound healing.  Continue antibiotic topical.               See wound doc progress notes. Documents will be scanned.        Jl Fishman  [facility]

## 2020-04-20 NOTE — ASSESSMENT & PLAN NOTE
Wound continues to make progress.  Continue debridement to maintain active phase wound healing.  Continue antibiotic topical.

## 2020-04-27 ENCOUNTER — OFFICE VISIT (OUTPATIENT)
Dept: WOUND CARE | Facility: HOSPITAL | Age: 60
End: 2020-04-27
Attending: SURGERY
Payer: COMMERCIAL

## 2020-04-27 VITALS
HEART RATE: 74 BPM | SYSTOLIC BLOOD PRESSURE: 125 MMHG | RESPIRATION RATE: 18 BRPM | TEMPERATURE: 98 F | DIASTOLIC BLOOD PRESSURE: 88 MMHG

## 2020-04-27 DIAGNOSIS — L97.412: ICD-10-CM

## 2020-04-27 PROCEDURE — 99499 NO LOS: ICD-10-PCS | Mod: ,,, | Performed by: SURGERY

## 2020-04-27 PROCEDURE — 27201912 HC WOUND CARE DEBRIDEMENT SUPPLIES

## 2020-04-27 PROCEDURE — 11042 DBRDMT SUBQ TIS 1ST 20SQCM/<: CPT

## 2020-04-27 PROCEDURE — 99499 UNLISTED E&M SERVICE: CPT | Mod: ,,, | Performed by: SURGERY

## 2020-04-27 NOTE — ASSESSMENT & PLAN NOTE
Tunneling is decreased.  Wound with granulation tissue.  No sign of infection.  No exposed bone.  No significant drainage.  Continue offloading.  Continue debridement is needed.  Continue topical antibiotic

## 2020-04-27 NOTE — PROGRESS NOTES
[facility]  Wound Care  Progress Note    Problem List Items Addressed This Visit     Neuropathic ulcer of heel, right, with fat layer exposed    Overview       HPI:  Patient sustained trauma to her right heel and developed an open wound several months ago.  Patient developed worsening wound infection and was admitted to the hospital and started on IV antibiotics.  She underwent debridement and states she was treated for osteomyelitis.  She was discharged to LTAC facility where she received 2 weeks of IV antibiotics.  Patient remains on oral antibiotics and has follow-up scheduled with Infectious Disease.  Patient had been receiving Dakin's dressing changes.  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 Patient also has a foot deformity.  Patient's had been attempting offloading with crutches but has difficulty ambulating with crutches.  She has been able to obtain a knee walker for use.  Patient denies any fever chills or significant drainage.      Location: right plantar heel    Duration: 10-1-19    Context: neuropathic, pt states her feeling comes and goes.    Associated Signs & Symptoms: pt c/o pain    Timing: random    Severity: 9/10 at times    Quality: sharp and burning    Modifying Factors: nothing    As of 04/20/2020, pain has resolved.             Current Assessment & Plan     Tunneling is decreased.  Wound with granulation tissue.  No sign of infection.  No exposed bone.  No significant drainage.  Continue offloading.  Continue debridement is needed.  Continue topical antibiotic               See wound doc progress notes. Documents will be scanned.        Delfin Silva  [facility]

## 2020-05-04 ENCOUNTER — OFFICE VISIT (OUTPATIENT)
Dept: WOUND CARE | Facility: HOSPITAL | Age: 60
End: 2020-05-04
Attending: SURGERY
Payer: COMMERCIAL

## 2020-05-04 VITALS — DIASTOLIC BLOOD PRESSURE: 88 MMHG | SYSTOLIC BLOOD PRESSURE: 155 MMHG | HEART RATE: 66 BPM | TEMPERATURE: 98 F

## 2020-05-04 DIAGNOSIS — L97.412: Primary | ICD-10-CM

## 2020-05-04 DIAGNOSIS — M21.371 FOOT DROP, RIGHT: ICD-10-CM

## 2020-05-04 PROCEDURE — 27201912 HC WOUND CARE DEBRIDEMENT SUPPLIES

## 2020-05-04 PROCEDURE — 11042 DBRDMT SUBQ TIS 1ST 20SQCM/<: CPT

## 2020-05-04 PROCEDURE — 99499 NO LOS: ICD-10-PCS | Mod: ,,, | Performed by: SURGERY

## 2020-05-04 PROCEDURE — 99499 UNLISTED E&M SERVICE: CPT | Mod: ,,, | Performed by: SURGERY

## 2020-05-04 NOTE — PROGRESS NOTES
[facility]  Wound Care  Progress Note    Problem List Items Addressed This Visit        Neuro    Foot drop, right    Overview     Patient has chronic right footdrop and has been use in an AFO a prosthetic for a long time.            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  Patient developed worsening wound infection and was admitted to the hospital and started on IV antibiotics.  She underwent debridement and states she was treated for osteomyelitis.  She was discharged to [facility] facility where she received 2 weeks of IV antibiotics.  Patient remains on oral antibiotics and has follow-up scheduled with Infectious Disease.  Patient had been receiving Dakin's dressing changes.  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 Patient also has a foot deformity.  Patient's had been attempting offloading with crutches but has difficulty ambulating with crutches.  She has been able to obtain a knee walker for use.  Patient denies any fever chills or significant drainage.      Location: right plantar heel    Duration: 10-1-19    Context: neuropathic, pt states her feeling comes and goes.    Associated Signs & Symptoms: pt c/o pain    Timing: random    Severity: 9/10 at times    Quality: sharp and burning    Modifying Factors: nothing    As of 04/20/2020, pain has resolved.             Current Assessment & Plan     Wound continues to slowly close.  Continue debridement to maintain active phase wound healing.  Continue topical antibiotic therapy to decrease bacterial colonization.               See wound doc progress notes. Documents will be scanned.        Jl Fishman  [facility]

## 2020-05-04 NOTE — ASSESSMENT & PLAN NOTE
Wound continues to slowly close.  Continue debridement to maintain active phase wound healing.  Continue topical antibiotic therapy to decrease bacterial colonization.

## 2020-05-11 ENCOUNTER — OFFICE VISIT (OUTPATIENT)
Dept: WOUND CARE | Facility: HOSPITAL | Age: 60
End: 2020-05-11
Attending: SURGERY
Payer: COMMERCIAL

## 2020-05-11 VITALS
TEMPERATURE: 98 F | SYSTOLIC BLOOD PRESSURE: 135 MMHG | HEART RATE: 76 BPM | RESPIRATION RATE: 18 BRPM | DIASTOLIC BLOOD PRESSURE: 93 MMHG

## 2020-05-11 DIAGNOSIS — L97.412: ICD-10-CM

## 2020-05-11 PROCEDURE — 99499 UNLISTED E&M SERVICE: CPT | Mod: ,,, | Performed by: SURGERY

## 2020-05-11 PROCEDURE — 99499 NO LOS: ICD-10-PCS | Mod: ,,, | Performed by: SURGERY

## 2020-05-11 PROCEDURE — 27201912 HC WOUND CARE DEBRIDEMENT SUPPLIES

## 2020-05-11 PROCEDURE — 11042 DBRDMT SUBQ TIS 1ST 20SQCM/<: CPT

## 2020-05-11 NOTE — ASSESSMENT & PLAN NOTE
Wound has significant amount of undermining.  The visible part of the wound is clean.  There is no sign of infection.  There appears to be some granulation tissue.  Unclear if this undermining will fill in.  Patient may need aggressive debridement in the operating room to remove the overlying tissue and expose the wound bed.  Will continue topical antibiotic for now.

## 2020-05-11 NOTE — PROGRESS NOTES
[facility]  Wound Care  Progress Note    Problem List Items Addressed This Visit     Neuropathic ulcer of heel, right, with fat layer exposed    Overview       HPI:  Patient sustained trauma to her right heel and developed an open wound several months ago.  Patient developed worsening wound infection and was admitted to the hospital and started on IV antibiotics.  She underwent debridement and states she was treated for osteomyelitis.  She was discharged to [facility]AC facility where she received 2 weeks of IV antibiotics.  Patient remains on oral antibiotics and has follow-up scheduled with Infectious Disease.  Patient had been receiving Dakin's dressing changes.  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 Patient also has a foot deformity.  Patient's had been attempting offloading with crutches but has difficulty ambulating with crutches.  She has been able to obtain a knee walker for use.  Patient denies any fever chills or significant drainage.      Location: right plantar heel    Duration: 10-1-19    Context: neuropathic, pt states her feeling comes and goes.    Associated Signs & Symptoms: pt c/o pain    Timing: random    Severity: 9/10 at times    Quality: sharp and burning    Modifying Factors: nothing    As of 04/20/2020, pain has resolved.             Current Assessment & Plan     Wound has significant amount of undermining.  The visible part of the wound is clean.  There is no sign of infection.  There appears to be some granulation tissue.  Unclear if this undermining will fill in.  Patient may need aggressive debridement in the operating room to remove the overlying tissue and expose the wound bed.  Will continue topical antibiotic for now.               See wound doc progress notes. Documents will be scanned.        Delfin Silva  [facility]

## 2020-05-18 ENCOUNTER — OFFICE VISIT (OUTPATIENT)
Dept: WOUND CARE | Facility: HOSPITAL | Age: 60
End: 2020-05-18
Attending: SURGERY
Payer: COMMERCIAL

## 2020-05-18 VITALS
HEART RATE: 60 BPM | RESPIRATION RATE: 18 BRPM | TEMPERATURE: 98 F | SYSTOLIC BLOOD PRESSURE: 132 MMHG | DIASTOLIC BLOOD PRESSURE: 83 MMHG

## 2020-05-18 DIAGNOSIS — L97.413 ULCER OF RIGHT HEEL, WITH NECROSIS OF MUSCLE: Primary | ICD-10-CM

## 2020-05-18 DIAGNOSIS — M21.371 FOOT DROP, RIGHT: ICD-10-CM

## 2020-05-18 PROCEDURE — 99499 NO LOS: ICD-10-PCS | Mod: ,,, | Performed by: SURGERY

## 2020-05-18 PROCEDURE — 99499 UNLISTED E&M SERVICE: CPT | Mod: ,,, | Performed by: SURGERY

## 2020-05-18 PROCEDURE — 11043 DBRDMT MUSC&/FSCA 1ST 20/<: CPT

## 2020-05-18 PROCEDURE — 27201912 HC WOUND CARE DEBRIDEMENT SUPPLIES

## 2020-05-18 NOTE — ASSESSMENT & PLAN NOTE
Wound overall continues to improve with closure.  Several strands of nonviable muscle and fascia were excised with scissors.  Hemostasis obtained.  Begin silver rolled when antibiotic powder is complete.

## 2020-05-18 NOTE — PROGRESS NOTES
[facility]  Wound Care  Progress Note    Problem List Items Addressed This Visit        Neuro    Foot drop, right    Overview     Patient has chronic right footdrop and has been use in an AFO a prosthetic for a long time.            Derm    Ulcer of right heel, with necrosis of muscle - Primary    Overview       HPI:  Patient sustained trauma to her right heel and developed an open wound several months ago.  Patient developed worsening wound infection and was admitted to the hospital and started on IV antibiotics.  She underwent debridement and states she was treated for osteomyelitis.  She was discharged to [facility] facility where she received 2 weeks of IV antibiotics.  Patient remains on oral antibiotics and has follow-up scheduled with Infectious Disease.  Patient had been receiving Dakin's dressing changes.  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 Patient also has a foot deformity.  Patient's had been attempting offloading with crutches but has difficulty ambulating with crutches.  She has been able to obtain a knee walker for use.  Patient denies any fever chills or significant drainage.      Location: right plantar heel    Duration: 10-1-19    Context: neuropathic, pt states her feeling comes and goes.    Associated Signs & Symptoms: pt c/o pain    Timing: random    Severity: 9/10 at times    Quality: sharp and burning    Modifying Factors: nothing    As of 04/20/2020, pain has resolved.  As of 05/18/2020, the wound was noted to involve muscle             Current Assessment & Plan     Wound overall continues to improve with closure.  Several strands of nonviable muscle and fascia were excised with scissors.  Hemostasis obtained.  Begin silver rolled when antibiotic powder is complete.               See wound doc progress notes. Documents will be scanned.        Jl Fishman  [facility]

## 2020-05-25 ENCOUNTER — OFFICE VISIT (OUTPATIENT)
Dept: WOUND CARE | Facility: HOSPITAL | Age: 60
End: 2020-05-25
Attending: SURGERY
Payer: COMMERCIAL

## 2020-05-25 VITALS
SYSTOLIC BLOOD PRESSURE: 133 MMHG | RESPIRATION RATE: 18 BRPM | DIASTOLIC BLOOD PRESSURE: 88 MMHG | TEMPERATURE: 97 F | HEART RATE: 69 BPM

## 2020-05-25 DIAGNOSIS — L97.413 ULCER OF RIGHT HEEL, WITH NECROSIS OF MUSCLE: ICD-10-CM

## 2020-05-25 PROCEDURE — 99499 UNLISTED E&M SERVICE: CPT | Mod: ,,, | Performed by: SURGERY

## 2020-05-25 PROCEDURE — 27201912 HC WOUND CARE DEBRIDEMENT SUPPLIES

## 2020-05-25 PROCEDURE — 99499 NO LOS: ICD-10-PCS | Mod: ,,, | Performed by: SURGERY

## 2020-05-25 PROCEDURE — 11042 DBRDMT SUBQ TIS 1ST 20SQCM/<: CPT

## 2020-05-25 NOTE — PROGRESS NOTES
[facility]  Wound Care  Progress Note    Problem List Items Addressed This Visit     Ulcer of right heel, with necrosis of muscle    Overview       HPI:  Patient sustained trauma to her right heel and developed an open wound several months ago.  Patient developed worsening wound infection and was admitted to the hospital and started on IV antibiotics.  She underwent debridement and states she was treated for osteomyelitis.  She was discharged to [facility] facility where she received 2 weeks of IV antibiotics.  Patient remains on oral antibiotics and has follow-up scheduled with Infectious Disease.  Patient had been receiving Dakin's dressing changes.  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 Patient also has a foot deformity.  Patient's had been attempting offloading with crutches but has difficulty ambulating with crutches.  She has been able to obtain a knee walker for use.  Patient denies any fever chills or significant drainage.      Location: right plantar heel    Duration: 10-1-19    Context: neuropathic, pt states her feeling comes and goes.    Associated Signs & Symptoms: pt c/o pain    Timing: random    Severity: 9/10 at times    Quality: sharp and burning    Modifying Factors: nothing    As of 04/20/2020, pain has resolved.  As of 05/18/2020, the wound was noted to involve muscle             Current Assessment & Plan     Wound is improved.  Did tunneling is decreased in length.  There is a fair amount of maceration surrounding the wound.  Will implement calcium alginate to help with the drainage.  Continue offloading.  Continued sharp debridement is needed               See wound doc progress notes. Documents will be scanned.        Delfin Silva  [facility]

## 2020-05-25 NOTE — ASSESSMENT & PLAN NOTE
Wound is improved.  Did tunneling is decreased in length.  There is a fair amount of maceration surrounding the wound.  Will implement calcium alginate to help with the drainage.  Continue offloading.  Continued sharp debridement is needed

## 2020-06-01 ENCOUNTER — OFFICE VISIT (OUTPATIENT)
Dept: WOUND CARE | Facility: HOSPITAL | Age: 60
End: 2020-06-01
Attending: SURGERY
Payer: COMMERCIAL

## 2020-06-01 VITALS — SYSTOLIC BLOOD PRESSURE: 138 MMHG | HEART RATE: 72 BPM | TEMPERATURE: 98 F | DIASTOLIC BLOOD PRESSURE: 85 MMHG

## 2020-06-01 DIAGNOSIS — L97.413 ULCER OF RIGHT HEEL, WITH NECROSIS OF MUSCLE: ICD-10-CM

## 2020-06-01 PROCEDURE — 99499 UNLISTED E&M SERVICE: CPT | Mod: ,,, | Performed by: SURGERY

## 2020-06-01 PROCEDURE — 11042 DBRDMT SUBQ TIS 1ST 20SQCM/<: CPT

## 2020-06-01 PROCEDURE — 27201912 HC WOUND CARE DEBRIDEMENT SUPPLIES

## 2020-06-01 PROCEDURE — 99499 NO LOS: ICD-10-PCS | Mod: ,,, | Performed by: SURGERY

## 2020-06-01 NOTE — PROGRESS NOTES
[facility]  Wound Care  Progress Note    Problem List Items Addressed This Visit     Ulcer of right heel, with necrosis of muscle    Overview       HPI:  Patient sustained trauma to her right heel and developed an open wound several months ago.  Patient developed worsening wound infection and was admitted to the hospital and started on IV antibiotics.  She underwent debridement and states she was treated for osteomyelitis.  She was discharged to [facility] facility where she received 2 weeks of IV antibiotics.  Patient remains on oral antibiotics and has follow-up scheduled with Infectious Disease.  Patient had been receiving Dakin's dressing changes.  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 Patient also has a foot deformity.  Patient's had been attempting offloading with crutches but has difficulty ambulating with crutches.  She has been able to obtain a knee walker for use.  Patient denies any fever chills or significant drainage.      Location: right plantar heel    Duration: 10-1-19    Context: neuropathic, pt states her feeling comes and goes.    Associated Signs & Symptoms: pt c/o pain    Timing: random    Severity: 9/10 at times    Quality: sharp and burning    Modifying Factors: nothing    As of 04/20/2020, pain has resolved.  As of 05/18/2020, the wound was noted to involve muscle             Current Assessment & Plan     Wound stable.  No sign of infection.  Continue sharp debridement is needed.  Continue offloading.  Will stop topical antibiotic as patient is out.  Will implement silver.               See wound doc progress notes. Documents will be scanned.        Delfin Silva  [facility]

## 2020-06-01 NOTE — ASSESSMENT & PLAN NOTE
Wound stable.  No sign of infection.  Continue sharp debridement is needed.  Continue offloading.  Will stop topical antibiotic as patient is out.  Will implement silver.

## 2020-06-15 ENCOUNTER — OFFICE VISIT (OUTPATIENT)
Dept: WOUND CARE | Facility: HOSPITAL | Age: 60
End: 2020-06-15
Attending: SURGERY
Payer: COMMERCIAL

## 2020-06-15 VITALS — TEMPERATURE: 98 F | HEART RATE: 66 BPM | SYSTOLIC BLOOD PRESSURE: 142 MMHG | DIASTOLIC BLOOD PRESSURE: 90 MMHG

## 2020-06-15 DIAGNOSIS — L97.413 ULCER OF RIGHT HEEL, WITH NECROSIS OF MUSCLE: ICD-10-CM

## 2020-06-15 PROCEDURE — 11042 DBRDMT SUBQ TIS 1ST 20SQCM/<: CPT

## 2020-06-15 PROCEDURE — 99499 UNLISTED E&M SERVICE: CPT | Mod: ,,, | Performed by: SURGERY

## 2020-06-15 PROCEDURE — 99499 NO LOS: ICD-10-PCS | Mod: ,,, | Performed by: SURGERY

## 2020-06-15 PROCEDURE — 27201912 HC WOUND CARE DEBRIDEMENT SUPPLIES

## 2020-06-15 NOTE — PROGRESS NOTES
[facility]  Wound Care  Progress Note    Problem List Items Addressed This Visit     Ulcer of right heel, with necrosis of muscle    Overview       HPI:  Patient sustained trauma to her right heel and developed an open wound several months ago.  Patient developed worsening wound infection and was admitted to the hospital and started on IV antibiotics.  She underwent debridement and states she was treated for osteomyelitis.  She was discharged to [facility] facility where she received 2 weeks of IV antibiotics.  Patient remains on oral antibiotics and has follow-up scheduled with Infectious Disease.  Patient had been receiving Dakin's dressing changes.  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 Patient also has a foot deformity.  Patient's had been attempting offloading with crutches but has difficulty ambulating with crutches.  She has been able to obtain a knee walker for use.  Patient denies any fever chills or significant drainage.      Location: right plantar heel    Duration: 10-1-19    Context: neuropathic, pt states her feeling comes and goes.    Associated Signs & Symptoms: pt c/o pain    Timing: random    Severity: 9/10 at times    Quality: sharp and burning    Modifying Factors: nothing    As of 04/20/2020, pain has resolved.  As of 05/18/2020, the wound was noted to involve muscle             Current Assessment & Plan     Wound unchanged.  No significant drainage or pain per patient.  Area appears clean with no sign of soft tissue infection.  Continue offloading.  Continued sharp debridement is needed.  Continue silver dressing.               See wound doc progress notes. Documents will be scanned.        Delfin Silva  [facility]

## 2020-06-15 NOTE — ASSESSMENT & PLAN NOTE
Wound unchanged.  No significant drainage or pain per patient.  Area appears clean with no sign of soft tissue infection.  Continue offloading.  Continued sharp debridement is needed.  Continue silver dressing.

## 2020-06-29 ENCOUNTER — OFFICE VISIT (OUTPATIENT)
Dept: WOUND CARE | Facility: HOSPITAL | Age: 60
End: 2020-06-29
Attending: SURGERY
Payer: COMMERCIAL

## 2020-06-29 VITALS
DIASTOLIC BLOOD PRESSURE: 93 MMHG | TEMPERATURE: 98 F | SYSTOLIC BLOOD PRESSURE: 138 MMHG | HEART RATE: 76 BPM | RESPIRATION RATE: 18 BRPM

## 2020-06-29 DIAGNOSIS — L97.413 ULCER OF RIGHT HEEL, WITH NECROSIS OF MUSCLE: ICD-10-CM

## 2020-06-29 PROCEDURE — 27201912 HC WOUND CARE DEBRIDEMENT SUPPLIES

## 2020-06-29 PROCEDURE — 99499 NO LOS: ICD-10-PCS | Mod: ,,, | Performed by: SURGERY

## 2020-06-29 PROCEDURE — 99499 UNLISTED E&M SERVICE: CPT | Mod: ,,, | Performed by: SURGERY

## 2020-06-29 PROCEDURE — 11042 DBRDMT SUBQ TIS 1ST 20SQCM/<: CPT

## 2020-06-29 NOTE — ASSESSMENT & PLAN NOTE
Wound continues to make slow progress.  Continue debridement to maintain active phase wound healing.  Continue offloading.

## 2020-06-29 NOTE — PROGRESS NOTES
[facility]  Wound Care  Progress Note    Problem List Items Addressed This Visit        Derm    Ulcer of right heel, with necrosis of muscle    Overview       HPI:  Patient sustained trauma to her right heel and developed an open wound several months ago.  Patient developed worsening wound infection and was admitted to the hospital and started on IV antibiotics.  She underwent debridement and states she was treated for osteomyelitis.  She was discharged to [facility] facility where she received 2 weeks of IV antibiotics.  Patient remains on oral antibiotics and has follow-up scheduled with Infectious Disease.  Patient had been receiving Dakin's dressing changes.  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 Patient also has a foot deformity.  Patient's had been attempting offloading with crutches but has difficulty ambulating with crutches.  She has been able to obtain a knee walker for use.  Patient denies any fever chills or significant drainage.      Location: right plantar heel    Duration: 10-1-19    Context: neuropathic, pt states her feeling comes and goes.    Modifying Factors: nothing    As of 04/20/2020, pain has resolved.  As of 05/18/2020, the wound was noted to involve muscle             Current Assessment & Plan     Wound continues to make slow progress.  Continue debridement to maintain active phase wound healing.  Continue offloading.               See wound doc progress notes. Documents will be scanned.        Jl Fishman  [facility]

## 2020-07-06 ENCOUNTER — OFFICE VISIT (OUTPATIENT)
Dept: WOUND CARE | Facility: HOSPITAL | Age: 60
End: 2020-07-06
Attending: SURGERY
Payer: COMMERCIAL

## 2020-07-06 VITALS
SYSTOLIC BLOOD PRESSURE: 120 MMHG | HEART RATE: 71 BPM | DIASTOLIC BLOOD PRESSURE: 85 MMHG | RESPIRATION RATE: 20 BRPM | TEMPERATURE: 98 F

## 2020-07-06 DIAGNOSIS — L97.413 ULCER OF RIGHT HEEL, WITH NECROSIS OF MUSCLE: ICD-10-CM

## 2020-07-06 PROCEDURE — 99499 NO LOS: ICD-10-PCS | Mod: ,,, | Performed by: SURGERY

## 2020-07-06 PROCEDURE — 99499 UNLISTED E&M SERVICE: CPT | Mod: ,,, | Performed by: SURGERY

## 2020-07-06 PROCEDURE — 27201912 HC WOUND CARE DEBRIDEMENT SUPPLIES

## 2020-07-06 PROCEDURE — 11042 DBRDMT SUBQ TIS 1ST 20SQCM/<: CPT

## 2020-07-06 NOTE — PROGRESS NOTES
[facility]  Wound Care  Progress Note    Problem List Items Addressed This Visit     Ulcer of right heel, with necrosis of muscle    Overview       HPI:  Patient sustained trauma to her right heel and developed an open wound several months ago.  Patient developed worsening wound infection and was admitted to the hospital and started on IV antibiotics.  She underwent debridement and states she was treated for osteomyelitis.  She was discharged to [facility] facility where she received 2 weeks of IV antibiotics.  Patient remains on oral antibiotics and has follow-up scheduled with Infectious Disease.  Patient had been receiving Dakin's dressing changes.  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 Patient also has a foot deformity.  Patient's had been attempting offloading with crutches but has difficulty ambulating with crutches.  She has been able to obtain a knee walker for use.  Patient denies any fever chills or significant drainage.      Location: right plantar heel    Duration: 10-1-19    Context: neuropathic, pt states her feeling comes and goes.    Modifying Factors: nothing    As of 04/20/2020, pain has resolved.  As of 05/18/2020, the wound was noted to involve muscle             Current Assessment & Plan     Wound remains clean.  There is no significant drainage or sign of infection.  There is granulation at the wound base.  Tunneling appears to be decreased.  Continue offloading.  Continued sharp debridement is needed.  Continue current dressing change               See wound doc progress notes. Documents will be scanned.        Delfin Silva  [facility]

## 2020-07-06 NOTE — ASSESSMENT & PLAN NOTE
Wound remains clean.  There is no significant drainage or sign of infection.  There is granulation at the wound base.  Tunneling appears to be decreased.  Continue offloading.  Continued sharp debridement is needed.  Continue current dressing change

## 2020-07-20 ENCOUNTER — OFFICE VISIT (OUTPATIENT)
Dept: WOUND CARE | Facility: HOSPITAL | Age: 60
End: 2020-07-20
Attending: SURGERY
Payer: COMMERCIAL

## 2020-07-20 VITALS — HEART RATE: 86 BPM | SYSTOLIC BLOOD PRESSURE: 148 MMHG | DIASTOLIC BLOOD PRESSURE: 80 MMHG | TEMPERATURE: 97 F

## 2020-07-20 DIAGNOSIS — L97.413 ULCER OF RIGHT HEEL, WITH NECROSIS OF MUSCLE: ICD-10-CM

## 2020-07-20 PROCEDURE — 99499 NO LOS: ICD-10-PCS | Mod: ,,, | Performed by: SURGERY

## 2020-07-20 PROCEDURE — 27201912 HC WOUND CARE DEBRIDEMENT SUPPLIES

## 2020-07-20 PROCEDURE — 99499 UNLISTED E&M SERVICE: CPT | Mod: ,,, | Performed by: SURGERY

## 2020-07-20 PROCEDURE — 11042 DBRDMT SUBQ TIS 1ST 20SQCM/<: CPT

## 2020-07-20 NOTE — ASSESSMENT & PLAN NOTE
The tunneling is decreased.  Continue offloading.  Continued sharp debridement.  Continue current dressing.

## 2020-07-20 NOTE — PROGRESS NOTES
[facility]  Wound Care  Progress Note    Problem List Items Addressed This Visit     Ulcer of right heel, with necrosis of muscle    Overview       HPI:  Patient sustained trauma to her right heel and developed an open wound several months ago.  Patient developed worsening wound infection and was admitted to the hospital and started on IV antibiotics.  She underwent debridement and states she was treated for osteomyelitis.  She was discharged to [facility] facility where she received 2 weeks of IV antibiotics.  Patient remains on oral antibiotics and has follow-up scheduled with Infectious Disease.  Patient had been receiving Dakin's dressing changes.  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 Patient also has a foot deformity.  Patient's had been attempting offloading with crutches but has difficulty ambulating with crutches.  She has been able to obtain a knee walker for use.  Patient denies any fever chills or significant drainage.      Location: right plantar heel    Duration: 10-1-19    Context: neuropathic, pt states her feeling comes and goes.    Modifying Factors: nothing    As of 04/20/2020, pain has resolved.  As of 05/18/2020, the wound was noted to involve muscle             Current Assessment & Plan     The tunneling is decreased.  Continue offloading.  Continued sharp debridement.  Continue current dressing.               See wound doc progress notes. Documents will be scanned.        Delfin Silva  [facility]

## 2020-08-03 ENCOUNTER — OFFICE VISIT (OUTPATIENT)
Dept: WOUND CARE | Facility: HOSPITAL | Age: 60
End: 2020-08-03
Attending: SURGERY
Payer: COMMERCIAL

## 2020-08-03 VITALS
TEMPERATURE: 98 F | SYSTOLIC BLOOD PRESSURE: 148 MMHG | RESPIRATION RATE: 18 BRPM | DIASTOLIC BLOOD PRESSURE: 87 MMHG | HEART RATE: 88 BPM

## 2020-08-03 DIAGNOSIS — L97.413 ULCER OF RIGHT HEEL, WITH NECROSIS OF MUSCLE: ICD-10-CM

## 2020-08-03 PROCEDURE — 99499 UNLISTED E&M SERVICE: CPT | Mod: ,,, | Performed by: SURGERY

## 2020-08-03 PROCEDURE — 99499 NO LOS: ICD-10-PCS | Mod: ,,, | Performed by: SURGERY

## 2020-08-03 PROCEDURE — 11042 DBRDMT SUBQ TIS 1ST 20SQCM/<: CPT

## 2020-08-03 PROCEDURE — 27201912 HC WOUND CARE DEBRIDEMENT SUPPLIES

## 2020-08-03 NOTE — PROGRESS NOTES
[facility]  Wound Care  Progress Note    Problem List Items Addressed This Visit     Ulcer of right heel, with necrosis of muscle    Overview       HPI:  Patient sustained trauma to her right heel and developed an open wound several months ago.  Patient developed worsening wound infection and was admitted to the hospital and started on IV antibiotics.  She underwent debridement and states she was treated for osteomyelitis.  She was discharged to [facility] facility where she received 2 weeks of IV antibiotics.  Patient remains on oral antibiotics and has follow-up scheduled with Infectious Disease.  Patient had been receiving Dakin's dressing changes.  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 Patient also has a foot deformity.  Patient's had been attempting offloading with crutches but has difficulty ambulating with crutches.  She has been able to obtain a knee walker for use.  Patient denies any fever chills or significant drainage.      Location: right plantar heel    Duration: 10-1-19    Context: neuropathic, pt states her feeling comes and goes.    Modifying Factors: nothing    As of 04/20/2020, pain has resolved.  As of 05/18/2020, the wound was noted to involve muscle             Current Assessment & Plan     Tunneling is decreased.  Wound bed granulating.  Some maceration and wound edges.  Continue packing with silver.  Continue offloading.               See wound doc progress notes. Documents will be scanned.        Delfin Silva  [facility]

## 2020-08-03 NOTE — ASSESSMENT & PLAN NOTE
Tunneling is decreased.  Wound bed granulating.  Some maceration and wound edges.  Continue packing with silver.  Continue offloading.

## 2020-08-10 ENCOUNTER — OFFICE VISIT (OUTPATIENT)
Dept: WOUND CARE | Facility: HOSPITAL | Age: 60
End: 2020-08-10
Attending: SURGERY
Payer: COMMERCIAL

## 2020-08-10 VITALS — TEMPERATURE: 98 F | DIASTOLIC BLOOD PRESSURE: 96 MMHG | HEART RATE: 76 BPM | SYSTOLIC BLOOD PRESSURE: 144 MMHG

## 2020-08-10 DIAGNOSIS — M21.371 FOOT DROP, RIGHT: Primary | ICD-10-CM

## 2020-08-10 DIAGNOSIS — L97.413 ULCER OF RIGHT HEEL, WITH NECROSIS OF MUSCLE: ICD-10-CM

## 2020-08-10 PROCEDURE — 99499 UNLISTED E&M SERVICE: CPT | Mod: ,,, | Performed by: SURGERY

## 2020-08-10 PROCEDURE — 27201912 HC WOUND CARE DEBRIDEMENT SUPPLIES

## 2020-08-10 PROCEDURE — 11042 DBRDMT SUBQ TIS 1ST 20SQCM/<: CPT

## 2020-08-10 PROCEDURE — 99499 NO LOS: ICD-10-PCS | Mod: ,,, | Performed by: SURGERY

## 2020-08-10 NOTE — ASSESSMENT & PLAN NOTE
Wound continues to slowly improve with decreased of volume tunnel and undermining.  Continue debridement to maintain active phase wound healing.  Continue offloading.

## 2020-08-10 NOTE — PROGRESS NOTES
[facility]  Wound Care  Progress Note    Problem List Items Addressed This Visit        Neuro    Foot drop, right - Primary    Overview     Patient has chronic right footdrop and has been use in an AFO a prosthetic for a long time.            Derm    Ulcer of right heel, with necrosis of muscle    Overview       HPI:  Patient sustained trauma to her right heel and developed an open wound several months ago.  Patient developed worsening wound infection and was admitted to the hospital and started on IV antibiotics.  She underwent debridement and states she was treated for osteomyelitis.  She was discharged to [facility] facility where she received 2 weeks of IV antibiotics.  Patient remains on oral antibiotics and has follow-up scheduled with Infectious Disease.  Patient had been receiving Dakin's dressing changes.  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 Patient also has a foot deformity.  Patient's had been attempting offloading with crutches but has difficulty ambulating with crutches.  She has been able to obtain a knee walker for use.  Patient denies any fever chills or significant drainage.      Location: right plantar heel    Duration: 10-1-19    Context: neuropathic, pt states her feeling comes and goes.    Modifying Factors: nothing    As of 04/20/2020, pain has resolved.  As of 05/18/2020, the wound was noted to involve muscle             Current Assessment & Plan     Wound continues to slowly improve with decreased of volume tunnel and undermining.  Continue debridement to maintain active phase wound healing.  Continue offloading.               See wound doc progress notes. Documents will be scanned.        Jl Fishman  [facility]

## 2020-08-31 ENCOUNTER — OFFICE VISIT (OUTPATIENT)
Dept: WOUND CARE | Facility: HOSPITAL | Age: 60
End: 2020-08-31
Attending: SURGERY
Payer: COMMERCIAL

## 2020-08-31 VITALS — TEMPERATURE: 99 F | HEART RATE: 94 BPM | SYSTOLIC BLOOD PRESSURE: 154 MMHG | DIASTOLIC BLOOD PRESSURE: 82 MMHG

## 2020-08-31 DIAGNOSIS — L97.413 ULCER OF RIGHT HEEL, WITH NECROSIS OF MUSCLE: ICD-10-CM

## 2020-08-31 PROCEDURE — 99499 NO LOS: ICD-10-PCS | Mod: ,,, | Performed by: SURGERY

## 2020-08-31 PROCEDURE — 11042 DBRDMT SUBQ TIS 1ST 20SQCM/<: CPT

## 2020-08-31 PROCEDURE — 27201912 HC WOUND CARE DEBRIDEMENT SUPPLIES

## 2020-08-31 PROCEDURE — 99499 UNLISTED E&M SERVICE: CPT | Mod: ,,, | Performed by: SURGERY

## 2020-08-31 NOTE — ASSESSMENT & PLAN NOTE
Total change.  Wound remains clean with no sign of infection.  There is a tract that remains with some tunneling and undermining.  Skin edges wound remains macerated.  No significant drainage.  Continue offloading.  Continue sharp debridement is needed.  Continue silver dressing.

## 2020-08-31 NOTE — PROGRESS NOTES
[facility]  Wound Care  Progress Note    Problem List Items Addressed This Visit     Ulcer of right heel, with necrosis of muscle    Overview       HPI:  Patient sustained trauma to her right heel and developed an open wound several months ago.  Patient developed worsening wound infection and was admitted to the hospital and started on IV antibiotics.  She underwent debridement and states she was treated for osteomyelitis.  She was discharged to [facility] facility where she received 2 weeks of IV antibiotics.  Patient remains on oral antibiotics and has follow-up scheduled with Infectious Disease.  Patient had been receiving Dakin's dressing changes.  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 Patient also has a foot deformity.  Patient's had been attempting offloading with crutches but has difficulty ambulating with crutches.  She has been able to obtain a knee walker for use.  Patient denies any fever chills or significant drainage.      Location: right plantar heel    Duration: 10-1-19    Context: neuropathic, pt states her feeling comes and goes.    Modifying Factors: nothing    As of 04/20/2020, pain has resolved.  As of 05/18/2020, the wound was noted to involve muscle             Current Assessment & Plan     Total change.  Wound remains clean with no sign of infection.  There is a tract that remains with some tunneling and undermining.  Skin edges wound remains macerated.  No significant drainage.  Continue offloading.  Continue sharp debridement is needed.  Continue silver dressing.               See wound doc progress notes. Documents will be scanned.        Delfin Silva  [facility]

## 2020-09-21 ENCOUNTER — OFFICE VISIT (OUTPATIENT)
Dept: WOUND CARE | Facility: HOSPITAL | Age: 60
End: 2020-09-21
Attending: SURGERY
Payer: COMMERCIAL

## 2020-09-21 VITALS
RESPIRATION RATE: 18 BRPM | DIASTOLIC BLOOD PRESSURE: 103 MMHG | TEMPERATURE: 98 F | HEART RATE: 67 BPM | SYSTOLIC BLOOD PRESSURE: 167 MMHG

## 2020-09-21 DIAGNOSIS — L97.413 ULCER OF RIGHT HEEL, WITH NECROSIS OF MUSCLE: ICD-10-CM

## 2020-09-21 PROCEDURE — 99499 UNLISTED E&M SERVICE: CPT | Mod: ,,, | Performed by: SURGERY

## 2020-09-21 PROCEDURE — 27201912 HC WOUND CARE DEBRIDEMENT SUPPLIES

## 2020-09-21 PROCEDURE — 99499 NO LOS: ICD-10-PCS | Mod: ,,, | Performed by: SURGERY

## 2020-09-21 PROCEDURE — 11042 DBRDMT SUBQ TIS 1ST 20SQCM/<: CPT

## 2020-09-21 NOTE — ASSESSMENT & PLAN NOTE
Wound continues to slowly get smaller.  Continue debridement to maintain active phase wound healing.  Follow-up in 2 weeks.

## 2020-09-21 NOTE — PROGRESS NOTES
[facility]  Wound Care  Progress Note    Problem List Items Addressed This Visit        Derm    Ulcer of right heel, with necrosis of muscle    Overview       HPI:  Patient sustained trauma to her right heel and developed an open wound several months ago.  Patient developed worsening wound infection and was admitted to the hospital and started on IV antibiotics.  She underwent debridement and states she was treated for osteomyelitis.  She was discharged to [facility] facility where she received 2 weeks of IV antibiotics.  Patient remains on oral antibiotics and has follow-up scheduled with Infectious Disease.  Patient had been receiving Dakin's dressing changes.  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 Patient also has a foot deformity.  Patient's had been attempting offloading with crutches but has difficulty ambulating with crutches.  She has been able to obtain a knee walker for use.  Patient denies any fever chills or significant drainage.      Location: right plantar heel    Duration: 10-1-19    Context: neuropathic, pt states her feeling comes and goes.    Modifying Factors: nothing    As of 04/20/2020, pain has resolved.  As of 05/18/2020, the wound was noted to involve muscle             Current Assessment & Plan     Wound continues to slowly get smaller.  Continue debridement to maintain active phase wound healing.  Follow-up in 2 weeks.               See wound doc progress notes. Documents will be scanned.        Jl Fishman  [facility]

## 2020-10-05 ENCOUNTER — OFFICE VISIT (OUTPATIENT)
Dept: WOUND CARE | Facility: HOSPITAL | Age: 60
End: 2020-10-05
Attending: SURGERY
Payer: COMMERCIAL

## 2020-10-05 VITALS — HEART RATE: 87 BPM | TEMPERATURE: 98 F | DIASTOLIC BLOOD PRESSURE: 84 MMHG | SYSTOLIC BLOOD PRESSURE: 136 MMHG

## 2020-10-05 DIAGNOSIS — L97.413 ULCER OF RIGHT HEEL, WITH NECROSIS OF MUSCLE: ICD-10-CM

## 2020-10-05 DIAGNOSIS — M21.371 FOOT DROP, RIGHT: Primary | ICD-10-CM

## 2020-10-05 DIAGNOSIS — L89.613 PRESSURE ULCER OF RIGHT HEEL, STAGE 3: ICD-10-CM

## 2020-10-05 PROCEDURE — 99499 NO LOS: ICD-10-PCS | Mod: ,,, | Performed by: SURGERY

## 2020-10-05 PROCEDURE — 11042 DBRDMT SUBQ TIS 1ST 20SQCM/<: CPT

## 2020-10-05 PROCEDURE — 99499 UNLISTED E&M SERVICE: CPT | Mod: ,,, | Performed by: SURGERY

## 2020-10-05 PROCEDURE — 27201912 HC WOUND CARE DEBRIDEMENT SUPPLIES

## 2020-10-05 NOTE — ASSESSMENT & PLAN NOTE
Wound is making excellent progress.  The underlying cavity is closing.  Continue debridement to maintain active phase wound healing.  Begin offloading with a Profore boot.

## 2020-10-05 NOTE — ASSESSMENT & PLAN NOTE
Callus debrided revealing an underlying stage III pressure ulcer.  Overlying nonviable tissue was removed including subcutaneous tissue.  Begin silver alginate.  I have recommended a Profore boot for offloading.  Patient will obtain.

## 2020-10-19 ENCOUNTER — OFFICE VISIT (OUTPATIENT)
Dept: WOUND CARE | Facility: HOSPITAL | Age: 60
End: 2020-10-19
Attending: SURGERY
Payer: COMMERCIAL

## 2020-10-19 VITALS
SYSTOLIC BLOOD PRESSURE: 149 MMHG | HEART RATE: 85 BPM | TEMPERATURE: 98 F | RESPIRATION RATE: 18 BRPM | DIASTOLIC BLOOD PRESSURE: 88 MMHG

## 2020-10-19 DIAGNOSIS — L97.413 ULCER OF RIGHT HEEL, WITH NECROSIS OF MUSCLE: ICD-10-CM

## 2020-10-19 PROCEDURE — 27201912 HC WOUND CARE DEBRIDEMENT SUPPLIES

## 2020-10-19 PROCEDURE — 99499 UNLISTED E&M SERVICE: CPT | Mod: ,,, | Performed by: SURGERY

## 2020-10-19 PROCEDURE — 11042 DBRDMT SUBQ TIS 1ST 20SQCM/<: CPT

## 2020-10-19 PROCEDURE — 99499 NO LOS: ICD-10-PCS | Mod: ,,, | Performed by: SURGERY

## 2020-10-19 NOTE — PROGRESS NOTES
[facility]  Wound Care  Progress Note    Problem List Items Addressed This Visit     Ulcer of right heel, with necrosis of muscle    Overview       HPI:  Patient sustained trauma to her right heel and developed an open wound several months ago.  Patient developed worsening wound infection and was admitted to the hospital and started on IV antibiotics.  She underwent debridement and states she was treated for osteomyelitis.  She was discharged to [facility] facility where she received 2 weeks of IV antibiotics.  Patient remains on oral antibiotics and has follow-up scheduled with Infectious Disease.  Patient had been receiving Dakin's dressing changes.  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 Patient also has a foot deformity.  Patient's had been attempting offloading with crutches but has difficulty ambulating with crutches.  She has been able to obtain a knee walker for use.  Patient denies any fever chills or significant drainage.      Location: right plantar heel    Duration: 10-1-19    Context: neuropathic, pt states her feeling comes and goes.    Modifying Factors: nothing    As of 04/20/2020, pain has resolved.  As of 05/18/2020, the wound was noted to involve muscle             Current Assessment & Plan     Wound has shown little change of the past 2 months.  This still remains tunneling.  There is not appear to be soft tissue infection.  There is bone palpable but not visible at the base of the wound.  There is no significant drainage.  Patient has been compliant with dressing changes and offloading.  No no real signs of healing or progress over the past few months.  Will obtain MRI to better evaluate for possible chronic bone infection prevent any wound healing.               See wound doc progress notes. Documents will be scanned.        Delfin Silva  [facility]

## 2020-10-19 NOTE — ASSESSMENT & PLAN NOTE
Wound has shown little change of the past 2 months.  This still remains tunneling.  There is not appear to be soft tissue infection.  There is bone palpable but not visible at the base of the wound.  There is no significant drainage.  Patient has been compliant with dressing changes and offloading.  No no real signs of healing or progress over the past few months.  Will obtain MRI to better evaluate for possible chronic bone infection prevent any wound healing.

## 2020-10-20 NOTE — PROGRESS NOTES
[facility]  Wound Care  Progress Note    Problem List Items Addressed This Visit        Neuro    Foot drop, right - Primary    Overview     Patient has chronic right footdrop and has been use in an AFO a prosthetic for a long time.            Derm    Ulcer of right heel, with necrosis of muscle    Overview       HPI:  Patient sustained trauma to her right heel and developed an open wound several months ago.  Patient developed worsening wound infection and was admitted to the hospital and started on IV antibiotics.  She underwent debridement and states she was treated for osteomyelitis.  She was discharged to [facility] facility where she received 2 weeks of IV antibiotics.  Patient remains on oral antibiotics and has follow-up scheduled with Infectious Disease.  Patient had been receiving Dakin's dressing changes.  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 Patient also has a foot deformity.  Patient's had been attempting offloading with crutches but has difficulty ambulating with crutches.  She has been able to obtain a knee walker for use.  Patient denies any fever chills or significant drainage.      Location: right plantar heel    Duration: 10-1-19    Context: neuropathic, pt states her feeling comes and goes.    Modifying Factors: nothing    As of 04/20/2020, pain has resolved.  As of 05/18/2020, the wound was noted to involve muscle             Current Assessment & Plan     Wound is making excellent progress.  The underlying cavity is closing.  Continue debridement to maintain active phase wound healing.  Begin offloading with a Profore boot.         Pressure ulcer of right lateral heel, stage 3    Overview     On wound visit 10/05/2020, patient was noted to have an area of thick callus that was somewhat boggy on the right lateral healed that was separate from her chronic wound on the plantar aspect of the heel.  Debridement was performed revealing an  underlying wound consistent with a stage III pressure ulcer.         Current Assessment & Plan     Callus debrided revealing an underlying stage III pressure ulcer.  Overlying nonviable tissue was removed including subcutaneous tissue.  Begin silver alginate.  I have recommended a Profore boot for offloading.  Patient will obtain.               See wound doc progress notes. Documents will be scanned.        Jl ANDERSON Fishman  [facility]

## 2020-11-02 ENCOUNTER — HOSPITAL ENCOUNTER (OUTPATIENT)
Dept: RADIOLOGY | Facility: HOSPITAL | Age: 60
Discharge: HOME | End: 2020-11-02
Attending: SURGERY
Payer: COMMERCIAL

## 2020-11-02 ENCOUNTER — OFFICE VISIT (OUTPATIENT)
Dept: WOUND CARE | Facility: HOSPITAL | Age: 60
End: 2020-11-02
Attending: SURGERY
Payer: COMMERCIAL

## 2020-11-02 VITALS
SYSTOLIC BLOOD PRESSURE: 171 MMHG | DIASTOLIC BLOOD PRESSURE: 115 MMHG | HEART RATE: 79 BPM | TEMPERATURE: 98 F | RESPIRATION RATE: 20 BRPM

## 2020-11-02 DIAGNOSIS — M21.371 FOOT DROP, RIGHT: ICD-10-CM

## 2020-11-02 DIAGNOSIS — L97.413 ULCER OF RIGHT HEEL, WITH NECROSIS OF MUSCLE: Primary | ICD-10-CM

## 2020-11-02 DIAGNOSIS — R93.7 ABNORMAL FINDINGS ON DIAGNOSTIC IMAGING OF OTHER PARTS OF MUSCULOSKELETAL SYSTEM: ICD-10-CM

## 2020-11-02 DIAGNOSIS — L89.613 PRESSURE ULCER OF RIGHT HEEL, STAGE 3: ICD-10-CM

## 2020-11-02 DIAGNOSIS — L97.413 ULCER OF RIGHT HEEL, WITH NECROSIS OF MUSCLE: ICD-10-CM

## 2020-11-02 PROCEDURE — 73630 X-RAY EXAM OF FOOT: CPT | Mod: TC,RT

## 2020-11-02 PROCEDURE — 73630 X-RAY EXAM OF FOOT: CPT | Mod: 26,RT,, | Performed by: RADIOLOGY

## 2020-11-02 PROCEDURE — 87186 SC STD MICRODIL/AGAR DIL: CPT

## 2020-11-02 PROCEDURE — 99499 NO LOS: ICD-10-PCS | Mod: ,,, | Performed by: SURGERY

## 2020-11-02 PROCEDURE — 11042 DBRDMT SUBQ TIS 1ST 20SQCM/<: CPT

## 2020-11-02 PROCEDURE — 87077 CULTURE AEROBIC IDENTIFY: CPT

## 2020-11-02 PROCEDURE — 87070 CULTURE OTHR SPECIMN AEROBIC: CPT

## 2020-11-02 PROCEDURE — 73630 XR FOOT COMPLETE 3 VIEW RIGHT: ICD-10-PCS | Mod: 26,RT,, | Performed by: RADIOLOGY

## 2020-11-02 PROCEDURE — 99499 UNLISTED E&M SERVICE: CPT | Mod: ,,, | Performed by: SURGERY

## 2020-11-02 PROCEDURE — 27201912 HC WOUND CARE DEBRIDEMENT SUPPLIES

## 2020-11-02 NOTE — PROGRESS NOTES
[facility]  Wound Care  Progress Note    Problem List Items Addressed This Visit        Neuro    Foot drop, right    Overview     Patient has chronic right footdrop and has been use in an AFO a prosthetic for a long time.            Derm    Ulcer of right heel, with necrosis of muscle - Primary    Overview       HPI:  Patient sustained trauma to her right heel and developed an open wound several months ago.  Patient developed worsening wound infection and was admitted to the hospital and started on IV antibiotics.  She underwent debridement and states she was treated for osteomyelitis.  She was discharged to [facility] facility where she received 2 weeks of IV antibiotics.  Patient remains on oral antibiotics and has follow-up scheduled with Infectious Disease.  Patient had been receiving Dakin's dressing changes.  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 Patient also has a foot deformity.  Patient's had been attempting offloading with crutches but has difficulty ambulating with crutches.  She has been able to obtain a knee walker for use.  Patient denies any fever chills or significant drainage.      Location: right plantar heel    Duration: 10-1-19    Context: neuropathic, pt states her feeling comes and goes.    Modifying Factors: nothing    As of 04/20/2020, pain has resolved.  As of 05/18/2020, the wound was noted to involve muscle.  On 11/02/2020, the wound has worsened with significant increase in size of the underlying cavity.  There is also an odor noted by the patient was well as increased drainage with signs of seropurulent drainage.  MRI was previously ordered on 10/19/2020 however it was not approved by insurance.             Current Assessment & Plan     Patient is demonstrating signs of underlying infection.  This may include underlying bone infection.  The wound has significantly worsened.  Cultures were taken.  Sed rate and CRP were  ordered.  CBC and BMP were normal.  X-ray of the right foot was performed revealing some irregularities of the calcaneus which are concerning for and could be consistent with underlying osteomyelitis.  MRI was recommended.  Initiate Cipro 500 mg twice a day and clindamycin 300 mg q.6 hours.  I recommended hospital admission to initiate IV antibiotic therapy and accelerate further evaluation of the foot and bone.  Patient declined admission today and would like to proceed with admission later in the week.  Will attempt to perform MRI as outpatient.  Patient may benefit from evaluation by Dr. Almendarez.         Relevant Orders    X-Ray Foot Complete 3 view Right (Completed)    CBC Without Differential (Completed)    COMPREHENSIVE METABOLIC PANEL (Completed)    Sedimentation rate (Completed)    C-REACTIVE PROTEIN    CULTURE, AEROBIC  (SPECIFY SOURCE)    Pressure ulcer of right lateral heel, stage 3    Overview     On wound visit 10/05/2020, patient was noted to have an area of thick callus that was somewhat boggy on the right lateral healed that was separate from her chronic wound on the plantar aspect of the heel.  Debridement was performed revealing an underlying wound consistent with a stage III pressure ulcer.         Current Assessment & Plan     Wound improving.  Continue debridement to maintain active phase wound healing.  Continue offloading.            Orthopedic    Abnormal findings on diagnostic imaging of right foot calcaneus    Overview     X-ray the right foot was performed.  There is concern about possible underlying acute or chronic osteomyelitis associated with irregularities of the calcaneus.  This is the area associated with the patient's wound.         Current Assessment & Plan     MRI of the right foot to be performed               See wound doc progress notes. Documents will be scanned.        Jl Fishman  [facility]  Progress  Note    Problem List Items Addressed This Visit        Neuro    Foot drop, right    Overview     Patient has chronic right footdrop and has been use in an AFO a prosthetic for a long time.            Derm    Ulcer of right heel, with necrosis of muscle - Primary    Overview       HPI:  Patient sustained trauma to her right heel and developed an open wound several months ago.  Patient developed worsening wound infection and was admitted to the hospital and started on IV antibiotics.  She underwent debridement and states she was treated for osteomyelitis.  She [facility] facility where she received 2 weeks of IV antibiotics.  Patient remains on oral antibiotics and has follow-up scheduled with Infectious Disease.  Patient had been receiving Dakin's dressing changes.  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 Patient also has a foot deformity.  Patient's had been attempting offloading with crutches but has difficulty ambulating with crutches.  She has been able to obtain a knee walker for use.  Patient denies any fever chills or significant drainage.      Location: right plantar heel    Duration: 10-1-19    Context: neuropathic, pt states her feeling comes and goes.    Modifying Factors: nothing    As of 04/20/2020, pain has resolved.  As of 05/18/2020, the wound was noted to involve muscle.  On 11/02/2020, the wound has worsened with significant increase in size of the underlying cavity.  There is also an odor noted by the patient was well as increased drainage with signs of seropurulent drainage.  MRI was previously ordered on 10/19/2020 however it was not approved by insurance.             Current Assessment & Plan     Patient is demonstrating signs of underlying infection.  This may include underlying bone infection.  The wound has significantly worsened.  Cultures were taken.  Sed rate and CRP were ordered.  CBC and BMP were normal.  X-ray of the right  foot was performed revealing some irregularities of the calcaneus which are concerning for and could be consistent with underlying osteomyelitis.  MRI was recommended.  Initiate Cipro 500 mg twice a day and clindamycin 300 mg q.6 hours.  I recommended hospital admission to initiate IV antibiotic therapy and accelerate further evaluation of the foot and bone.  Patient declined admission today and would like to proceed with admission later in the week.  Will attempt to perform MRI as outpatient.  Patient may benefit from evaluation by Dr. Almendarez.         Relevant Orders    X-Ray Foot Complete 3 view Right (Completed)    CBC Without Differential (Completed)    COMPREHENSIVE METABOLIC PANEL (Completed)    Sedimentation rate (Completed)    C-REACTIVE PROTEIN    CULTURE, AEROBIC  (SPECIFY SOURCE)    Pressure ulcer of right lateral heel, stage 3    Overview     On wound visit 10/05/2020, patient was noted to have an area of thick callus that was somewhat boggy on the right lateral healed that was separate from her chronic wound on the plantar aspect of the heel.  Debridement was performed revealing an underlying wound consistent with a stage III pressure ulcer.         Current Assessment & Plan     Wound improving.  Continue debridement to maintain active phase wound healing.  Continue offloading.               See wound doc progress notes. Documents will be scanned.        Jl ANDERSON Fishman  [facility]

## 2020-11-02 NOTE — ASSESSMENT & PLAN NOTE
Patient is demonstrating signs of underlying infection.  This may include underlying bone infection.  The wound has significantly worsened.  Cultures were taken.  Sed rate and CRP were ordered.  CBC and BMP were normal.  X-ray of the right foot was performed revealing some irregularities of the calcaneus which are concerning for and could be consistent with underlying osteomyelitis.  MRI was recommended.  Initiate Cipro 500 mg twice a day and clindamycin 300 mg q.6 hours.  I recommended hospital admission to initiate IV antibiotic therapy and accelerate further evaluation of the foot and bone.  Patient declined admission today and would like to proceed with admission later in the week.  Will attempt to perform MRI as outpatient.  Patient may benefit from evaluation by Dr. Almendarez.

## 2020-11-02 NOTE — ASSESSMENT & PLAN NOTE
Wound improving.  Continue debridement to maintain active phase wound healing.  Continue offloading.

## 2020-11-05 LAB — BACTERIA SPEC AEROBE CULT: ABNORMAL
